# Patient Record
Sex: FEMALE | Race: WHITE | Employment: OTHER | URBAN - NONMETROPOLITAN AREA
[De-identification: names, ages, dates, MRNs, and addresses within clinical notes are randomized per-mention and may not be internally consistent; named-entity substitution may affect disease eponyms.]

---

## 2019-12-26 ENCOUNTER — OFFICE VISIT (OUTPATIENT)
Dept: FAMILY MEDICINE CLINIC | Age: 69
End: 2019-12-26
Payer: MEDICARE

## 2019-12-26 VITALS
HEIGHT: 61 IN | TEMPERATURE: 97.1 F | WEIGHT: 224 LBS | OXYGEN SATURATION: 96 % | BODY MASS INDEX: 42.29 KG/M2 | HEART RATE: 68 BPM | DIASTOLIC BLOOD PRESSURE: 78 MMHG | SYSTOLIC BLOOD PRESSURE: 130 MMHG

## 2019-12-26 DIAGNOSIS — J20.9 ACUTE BRONCHITIS, UNSPECIFIED ORGANISM: Primary | ICD-10-CM

## 2019-12-26 PROCEDURE — G8427 DOCREV CUR MEDS BY ELIG CLIN: HCPCS | Performed by: PHYSICIAN ASSISTANT

## 2019-12-26 PROCEDURE — 1090F PRES/ABSN URINE INCON ASSESS: CPT | Performed by: PHYSICIAN ASSISTANT

## 2019-12-26 PROCEDURE — 1123F ACP DISCUSS/DSCN MKR DOCD: CPT | Performed by: PHYSICIAN ASSISTANT

## 2019-12-26 PROCEDURE — 4040F PNEUMOC VAC/ADMIN/RCVD: CPT | Performed by: PHYSICIAN ASSISTANT

## 2019-12-26 PROCEDURE — G8419 CALC BMI OUT NRM PARAM NOF/U: HCPCS | Performed by: PHYSICIAN ASSISTANT

## 2019-12-26 PROCEDURE — 4004F PT TOBACCO SCREEN RCVD TLK: CPT | Performed by: PHYSICIAN ASSISTANT

## 2019-12-26 PROCEDURE — G8400 PT W/DXA NO RESULTS DOC: HCPCS | Performed by: PHYSICIAN ASSISTANT

## 2019-12-26 PROCEDURE — 99203 OFFICE O/P NEW LOW 30 MIN: CPT | Performed by: PHYSICIAN ASSISTANT

## 2019-12-26 PROCEDURE — G8484 FLU IMMUNIZE NO ADMIN: HCPCS | Performed by: PHYSICIAN ASSISTANT

## 2019-12-26 PROCEDURE — 3017F COLORECTAL CA SCREEN DOC REV: CPT | Performed by: PHYSICIAN ASSISTANT

## 2019-12-26 RX ORDER — FENOFIBRATE 160 MG/1
TABLET ORAL
COMMUNITY
Start: 2019-11-19

## 2019-12-26 RX ORDER — GLIPIZIDE 5 MG/1
TABLET, FILM COATED, EXTENDED RELEASE ORAL
COMMUNITY
Start: 2019-11-19

## 2019-12-26 RX ORDER — CEFDINIR 300 MG/1
300 CAPSULE ORAL 2 TIMES DAILY
Qty: 20 CAPSULE | Refills: 0 | Status: SHIPPED | OUTPATIENT
Start: 2019-12-26 | End: 2020-01-05

## 2019-12-26 RX ORDER — GABAPENTIN 300 MG/1
CAPSULE ORAL
COMMUNITY
Start: 2019-10-10

## 2019-12-26 RX ORDER — METHYLPREDNISOLONE 4 MG/1
TABLET ORAL
Qty: 1 KIT | Refills: 0 | Status: SHIPPED | OUTPATIENT
Start: 2019-12-26 | End: 2022-03-15

## 2019-12-26 RX ORDER — METHOCARBAMOL 750 MG/1
TABLET ORAL
COMMUNITY
Start: 2019-11-07

## 2019-12-26 RX ORDER — OMEPRAZOLE 20 MG/1
CAPSULE, DELAYED RELEASE ORAL
COMMUNITY
Start: 2019-10-22

## 2019-12-26 RX ORDER — METOPROLOL TARTRATE 50 MG/1
TABLET, FILM COATED ORAL
COMMUNITY
Start: 2019-11-19

## 2019-12-26 RX ORDER — PAROXETINE HYDROCHLORIDE 40 MG/1
TABLET, FILM COATED ORAL
COMMUNITY
Start: 2019-11-19

## 2019-12-26 RX ORDER — HYDROXYZINE HYDROCHLORIDE 10 MG/1
TABLET, FILM COATED ORAL
COMMUNITY
Start: 2019-11-19 | End: 2022-03-15

## 2019-12-26 RX ORDER — MELOXICAM 7.5 MG/1
TABLET ORAL
COMMUNITY
Start: 2019-11-19

## 2019-12-26 RX ORDER — HYDROCHLOROTHIAZIDE 25 MG/1
TABLET ORAL
COMMUNITY
Start: 2019-09-18

## 2022-01-31 ENCOUNTER — OFFICE VISIT (OUTPATIENT)
Dept: NEUROSURGERY | Age: 72
End: 2022-01-31
Payer: MEDICARE

## 2022-01-31 VITALS — HEIGHT: 61 IN | OXYGEN SATURATION: 98 % | RESPIRATION RATE: 18 BRPM | WEIGHT: 224 LBS | BODY MASS INDEX: 42.29 KG/M2

## 2022-01-31 DIAGNOSIS — D32.9 MENINGIOMA (HCC): Primary | ICD-10-CM

## 2022-01-31 DIAGNOSIS — R55 SYNCOPE, UNSPECIFIED SYNCOPE TYPE: ICD-10-CM

## 2022-01-31 PROCEDURE — 3017F COLORECTAL CA SCREEN DOC REV: CPT | Performed by: NEUROLOGICAL SURGERY

## 2022-01-31 PROCEDURE — 1123F ACP DISCUSS/DSCN MKR DOCD: CPT | Performed by: NEUROLOGICAL SURGERY

## 2022-01-31 PROCEDURE — 1090F PRES/ABSN URINE INCON ASSESS: CPT | Performed by: NEUROLOGICAL SURGERY

## 2022-01-31 PROCEDURE — G8484 FLU IMMUNIZE NO ADMIN: HCPCS | Performed by: NEUROLOGICAL SURGERY

## 2022-01-31 PROCEDURE — G8400 PT W/DXA NO RESULTS DOC: HCPCS | Performed by: NEUROLOGICAL SURGERY

## 2022-01-31 PROCEDURE — 99203 OFFICE O/P NEW LOW 30 MIN: CPT | Performed by: NEUROLOGICAL SURGERY

## 2022-01-31 PROCEDURE — 4004F PT TOBACCO SCREEN RCVD TLK: CPT | Performed by: NEUROLOGICAL SURGERY

## 2022-01-31 PROCEDURE — 4040F PNEUMOC VAC/ADMIN/RCVD: CPT | Performed by: NEUROLOGICAL SURGERY

## 2022-01-31 PROCEDURE — G8417 CALC BMI ABV UP PARAM F/U: HCPCS | Performed by: NEUROLOGICAL SURGERY

## 2022-01-31 PROCEDURE — G8427 DOCREV CUR MEDS BY ELIG CLIN: HCPCS | Performed by: NEUROLOGICAL SURGERY

## 2022-01-31 RX ORDER — BENZTROPINE MESYLATE 0.5 MG/1
TABLET ORAL
COMMUNITY
Start: 2022-01-06

## 2022-01-31 RX ORDER — ARIPIPRAZOLE 10 MG/1
TABLET ORAL
COMMUNITY
Start: 2021-12-09

## 2022-01-31 RX ORDER — LANCETS 33 GAUGE
EACH MISCELLANEOUS
COMMUNITY
Start: 2022-01-11

## 2022-01-31 RX ORDER — ATORVASTATIN CALCIUM 40 MG/1
TABLET, FILM COATED ORAL
COMMUNITY
Start: 2021-12-17

## 2022-01-31 RX ORDER — CALCIUM CITRATE/VITAMIN D3 200MG-6.25
TABLET ORAL
COMMUNITY
Start: 2022-01-11

## 2022-01-31 NOTE — LETTER
[address] [place] [address] 59. [address] [facility] [address]  Phone: [phone]  Fax: [phone]           Nicole Breaux MD      January 31, 2022     Patient: Mi Patel   MR Number: <[id number]>   YOB: 1950   Date of Visit: 1/31/2022       Dear Dr. Lin Links ref. provider found: Thank you for referring Mi Patel to me for evaluation/treatment. Below are the relevant portions of my assessment and plan of care. If you have questions, please do not hesitate to call me. I look forward to following Loreto ABRAHAM along with you.     Sincerely,        Nicole Breaux MD    CC providers:    No Recipients
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
## 2022-02-21 ENCOUNTER — TELEPHONE (OUTPATIENT)
Dept: ADMINISTRATIVE | Age: 72
End: 2022-02-21

## 2022-02-21 NOTE — TELEPHONE ENCOUNTER
Pt called to check the status of her referral to neurology. Two previous in-basket messages have been sent to review the referral since pt was referred for syncope from Dr. Vincent Aranda. Advised pt to contact pcp to be referred to a cardiologist.   Pt is concerned because she has non-malignant brain tumor. She thought Magdalena Merchant was reviewing the referral to see if she could schedule.   Please advise if able to schedule with Radha Ang, or if she needs to see a cardiologist first.

## 2022-03-11 ENCOUNTER — TELEPHONE (OUTPATIENT)
Dept: NEUROSURGERY | Age: 72
End: 2022-03-11

## 2022-03-11 DIAGNOSIS — F40.240 CLAUSTROPHOBIA: ICD-10-CM

## 2022-03-11 DIAGNOSIS — F41.9 ANXIETY: Primary | ICD-10-CM

## 2022-03-11 RX ORDER — LORAZEPAM 0.5 MG/1
0.5 TABLET ORAL ONCE
Qty: 2 TABLET | Refills: 0 | Status: SHIPPED | OUTPATIENT
Start: 2022-03-11 | End: 2022-03-11

## 2022-03-11 NOTE — TELEPHONE ENCOUNTER
Called pt adam to let her know Ativan was called in to pharmacy and she would need to take it 30 minutes before her mri in May.

## 2022-03-11 NOTE — TELEPHONE ENCOUNTER
Patient is scheduled for mri on 5/7/22. She has claustrophobia. She wants a rx for it. Would like it to be sent to [facility] in [place].

## 2022-03-15 ENCOUNTER — OFFICE VISIT (OUTPATIENT)
Dept: NEUROLOGY | Age: 72
End: 2022-03-15
Payer: MEDICARE

## 2022-03-15 VITALS
DIASTOLIC BLOOD PRESSURE: 86 MMHG | WEIGHT: 222 LBS | TEMPERATURE: 97.6 F | BODY MASS INDEX: 43.59 KG/M2 | HEART RATE: 71 BPM | HEIGHT: 60 IN | SYSTOLIC BLOOD PRESSURE: 151 MMHG | OXYGEN SATURATION: 97 %

## 2022-03-15 DIAGNOSIS — R55 ATYPICAL SYNCOPE: ICD-10-CM

## 2022-03-15 DIAGNOSIS — G45.9 TIA (TRANSIENT ISCHEMIC ATTACK): Primary | ICD-10-CM

## 2022-03-15 DIAGNOSIS — R41.3 SHORT-TERM MEMORY LOSS: ICD-10-CM

## 2022-03-15 DIAGNOSIS — G45.9 TIA (TRANSIENT ISCHEMIC ATTACK): ICD-10-CM

## 2022-03-15 LAB
ALBUMIN SERPL-MCNC: 4.2 G/DL (ref 3.5–5.2)
ALP BLD-CCNC: 59 U/L (ref 35–104)
ALT SERPL-CCNC: 26 U/L (ref 0–32)
ANION GAP SERPL CALCULATED.3IONS-SCNC: 17 MMOL/L (ref 7–16)
AST SERPL-CCNC: 22 U/L (ref 0–31)
BASOPHILS ABSOLUTE: 0.03 E9/L (ref 0–0.2)
BASOPHILS RELATIVE PERCENT: 0.4 % (ref 0–2)
BILIRUB SERPL-MCNC: 0.4 MG/DL (ref 0–1.2)
BUN BLDV-MCNC: 17 MG/DL (ref 6–23)
CALCIUM SERPL-MCNC: 9.6 MG/DL (ref 8.6–10.2)
CHLORIDE BLD-SCNC: 100 MMOL/L (ref 98–107)
CO2: 24 MMOL/L (ref 22–29)
CREAT SERPL-MCNC: 1 MG/DL (ref 0.5–1)
EOSINOPHILS ABSOLUTE: 0.21 E9/L (ref 0.05–0.5)
EOSINOPHILS RELATIVE PERCENT: 3.1 % (ref 0–6)
FOLATE: 12.8 NG/ML (ref 4.8–24.2)
GFR AFRICAN AMERICAN: >60
GFR NON-AFRICAN AMERICAN: 55 ML/MIN/1.73
GLUCOSE BLD-MCNC: 136 MG/DL (ref 74–99)
HCT VFR BLD CALC: 36.7 % (ref 34–48)
HEMOGLOBIN: 12.1 G/DL (ref 11.5–15.5)
IMMATURE GRANULOCYTES #: 0.02 E9/L
IMMATURE GRANULOCYTES %: 0.3 % (ref 0–5)
LYMPHOCYTES ABSOLUTE: 1.45 E9/L (ref 1.5–4)
LYMPHOCYTES RELATIVE PERCENT: 21.2 % (ref 20–42)
MCH RBC QN AUTO: 29 PG (ref 26–35)
MCHC RBC AUTO-ENTMCNC: 33 % (ref 32–34.5)
MCV RBC AUTO: 88 FL (ref 80–99.9)
MONOCYTES ABSOLUTE: 0.59 E9/L (ref 0.1–0.95)
MONOCYTES RELATIVE PERCENT: 8.6 % (ref 2–12)
NEUTROPHILS ABSOLUTE: 4.55 E9/L (ref 1.8–7.3)
NEUTROPHILS RELATIVE PERCENT: 66.4 % (ref 43–80)
PDW BLD-RTO: 14 FL (ref 11.5–15)
PLATELET # BLD: 173 E9/L (ref 130–450)
PMV BLD AUTO: 11.2 FL (ref 7–12)
POTASSIUM SERPL-SCNC: 4.9 MMOL/L (ref 3.5–5)
RBC # BLD: 4.17 E12/L (ref 3.5–5.5)
SODIUM BLD-SCNC: 141 MMOL/L (ref 132–146)
TOTAL PROTEIN: 7.3 G/DL (ref 6.4–8.3)
VITAMIN B-12: 529 PG/ML (ref 211–946)
VITAMIN D 25-HYDROXY: 52 NG/ML (ref 30–100)
WBC # BLD: 6.9 E9/L (ref 4.5–11.5)

## 2022-03-15 PROCEDURE — 3017F COLORECTAL CA SCREEN DOC REV: CPT | Performed by: NURSE PRACTITIONER

## 2022-03-15 PROCEDURE — G8427 DOCREV CUR MEDS BY ELIG CLIN: HCPCS | Performed by: NURSE PRACTITIONER

## 2022-03-15 PROCEDURE — 1123F ACP DISCUSS/DSCN MKR DOCD: CPT | Performed by: NURSE PRACTITIONER

## 2022-03-15 PROCEDURE — 1090F PRES/ABSN URINE INCON ASSESS: CPT | Performed by: NURSE PRACTITIONER

## 2022-03-15 PROCEDURE — G8417 CALC BMI ABV UP PARAM F/U: HCPCS | Performed by: NURSE PRACTITIONER

## 2022-03-15 PROCEDURE — 99204 OFFICE O/P NEW MOD 45 MIN: CPT | Performed by: NURSE PRACTITIONER

## 2022-03-15 PROCEDURE — G8484 FLU IMMUNIZE NO ADMIN: HCPCS | Performed by: NURSE PRACTITIONER

## 2022-03-15 PROCEDURE — G8400 PT W/DXA NO RESULTS DOC: HCPCS | Performed by: NURSE PRACTITIONER

## 2022-03-15 PROCEDURE — 4004F PT TOBACCO SCREEN RCVD TLK: CPT | Performed by: NURSE PRACTITIONER

## 2022-03-15 PROCEDURE — 4040F PNEUMOC VAC/ADMIN/RCVD: CPT | Performed by: NURSE PRACTITIONER
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
## 2022-03-20 LAB — VITAMIN B1 WHOLE BLOOD: 111 NMOL/L (ref 70–180)

## 2022-03-28 ENCOUNTER — HOSPITAL ENCOUNTER (OUTPATIENT)
Dept: SPEECH THERAPY | Age: 72
Setting detail: THERAPIES SERIES
Discharge: HOME | End: 2022-03-28
Payer: MEDICARE

## 2022-03-28 PROCEDURE — 96125 COGNITIVE TEST BY HC PRO: CPT

## 2022-03-28 NOTE — PROGRESS NOTES
[address] [address]  Outpatient Speech Therapy  Phone: [phone] Fax: [phone] [address] [facility] INFORMATION PROCESSING ASSESSMENT-2 (RIPA-2)   EVALUATION RESULTS and PLAN OF CARE    PATIENT NAME:  Magi Garcia  (female)     MRN:  [id number]    :  1950  (80 y.o.)  STATUS:  Outpatient clinic   TODAY'S DATE:  3/28/2022  REFERRING PROVIDER:    KASH Sparks    PROVIDER NPI:  [id number]  [facility]  Date of order:  3-15-22  REFERRAL DIAGNOSIS:  Short term memory loss, R41.3  EVALUATING THERAPIST: GO Trevino    CERTIFICATION/RECERTIFICATION PERIOD: 3/28/2022 to 22  INSURANCE PROVIDER: Medicare    CPT Codes  EVALUATION: 15200  standardized cognitive performance testing (per hour)  TREATMENT:  Patient deferred formal therapy at this time    REFERRING/TREATMENT DIAGNOSIS: Other amnesia [R41.3]          SPEECH THERAPY  PLAN OF CARE   The speech therapy  POC is established based on physician order, speech pathology diagnosis and results of clinical assessment     SPEECH PATHOLOGY DIAGNOSIS:      Mild Cognitive Deficit in the areas of Immediate Memory, Recall of General Information, Organization, and Auditory Processing/Retention    Intervention is not warranted at this time, as patient declined coming in for formal therapy. She would like to use this evaluation for baseline information. She will continue to follow with her neurologist.      Conditions Requiring Skilled Therapeutic Intervention for speech, language and/or cognition    Not applicable     Specific Speech Therapy Interventions to Include:   Not applicable    Specific instructions for next treatment:    not applicable    SHORT/LONG TERM GOALS  Not applicable. Patient declined formal therapy at this time.      Patient goals: Patient/family involved in developing goals and treatment plan:   Treatment goals discussed with Patient    The Patient understand(s) the diagnosis, prognosis and plan of care   The patient/family Agreed with above,     This plan may be re-evaluated and revised as warranted. Rehabilitation Potential/Prognosis: not applicable                            History: Patient expressed mild deficits with both her short-term and long-term memory, but these deficits do not bother her other than her inability to drive. She lives alone and completes her ADLs with some assistance from family with finances. Stimulus questions were obtained from the RIPA-2. There are 30 possible points for each subtest.   Severity ratings for each subtest were determined as follows:     RAW SCORE  SEVERITY    28-30  Within functional limits    25-27  Mild deficit    22-24  Moderate deficit    19-21  Marked deficit    16-18 Severe deficit   Below 16 Profound deficit         Subtest  Raw Score /Severity    Immediate Memory  26/30- Mild deficit   Recent Memory  28/30- WFL   Temporal Orientation   (Recent Memory)  30/30-WFL   Temporal Orientation   (Remote Memory)  29/30-WFL   Spatial Orientation  29/30-WFL   Orientation to Environment  30/30-WFL   Recall of General Information  25/30- Mild defcicit   Problem Solving & Abstract Reasoning  28/30-WFL   Organization  27/30-Mild deficit   Auditory Processing   & Retention  26/30- Mild deficit      VARIANT OBSERVATIONS PRESENT DURING THE EVALATION:   Errors  Partially correct  Tangential  Self corrected                               EDUCATION:   The Speech Language Pathologist (SLP) completed education regarding results of evaluation and that intervention is not warranted at this time. (patient declined formal therapy)  Learner: Patient  Education: Reviewed results and recommendations of this evaluation  Evaluation of Education:  Verbalizes understanding    This plan may be re-evaluated and revised as warranted.       Treatment goals discussed with Patient   The Patient understand(s) the diagnosis, prognosis and plan of care     Evaluation Time includes thorough review of current medical information, gathering information on past medical history/social history and prior level of function, completion of standardized testing/informal observation of tasks, assessment of data and education on plan of care and goals. The admitting diagnosis and active problem list, as listed below have been reviewed prior to initiation of this evaluation. ACTIVE PROBLEM LIST: There is no problem list on file for this patient. [facility] [address] [address] [place] [address]  3/28/2022      [address]        Phone: [phone]     If you have any questions or concerns, please don't hesitate to call. Thank you for your referral.    Physician/Provider Signature:________________________________Date:__________________  By signing above, the therapists plan is approved by the physician/provider. All patients under [employer] must be signed by physician/provider.

## 2022-04-06 ENCOUNTER — HOSPITAL ENCOUNTER (OUTPATIENT)
Dept: NEUROLOGY | Age: 72
Discharge: HOME | End: 2022-04-06
Payer: MEDICARE

## 2022-04-06 DIAGNOSIS — R55 ATYPICAL SYNCOPE: ICD-10-CM

## 2022-04-06 PROCEDURE — 95819 EEG AWAKE AND ASLEEP: CPT

## 2022-04-08 ENCOUNTER — OFFICE VISIT (OUTPATIENT)
Dept: NEUROLOGY | Age: 72
End: 2022-04-08
Payer: MEDICARE

## 2022-04-08 VITALS
TEMPERATURE: 97.9 F | DIASTOLIC BLOOD PRESSURE: 70 MMHG | HEART RATE: 77 BPM | SYSTOLIC BLOOD PRESSURE: 144 MMHG | OXYGEN SATURATION: 96 %

## 2022-04-08 DIAGNOSIS — G45.9 TIA (TRANSIENT ISCHEMIC ATTACK): Primary | ICD-10-CM

## 2022-04-08 DIAGNOSIS — R41.3 SHORT-TERM MEMORY LOSS: ICD-10-CM

## 2022-04-08 DIAGNOSIS — R55 ATYPICAL SYNCOPE: ICD-10-CM

## 2022-04-08 PROCEDURE — G8417 CALC BMI ABV UP PARAM F/U: HCPCS | Performed by: NURSE PRACTITIONER

## 2022-04-08 PROCEDURE — 3017F COLORECTAL CA SCREEN DOC REV: CPT | Performed by: NURSE PRACTITIONER

## 2022-04-08 PROCEDURE — G8400 PT W/DXA NO RESULTS DOC: HCPCS | Performed by: NURSE PRACTITIONER

## 2022-04-08 PROCEDURE — 1123F ACP DISCUSS/DSCN MKR DOCD: CPT | Performed by: NURSE PRACTITIONER

## 2022-04-08 PROCEDURE — 1090F PRES/ABSN URINE INCON ASSESS: CPT | Performed by: NURSE PRACTITIONER

## 2022-04-08 PROCEDURE — 99213 OFFICE O/P EST LOW 20 MIN: CPT | Performed by: NURSE PRACTITIONER

## 2022-04-08 PROCEDURE — G8427 DOCREV CUR MEDS BY ELIG CLIN: HCPCS | Performed by: NURSE PRACTITIONER

## 2022-04-08 PROCEDURE — 4040F PNEUMOC VAC/ADMIN/RCVD: CPT | Performed by: NURSE PRACTITIONER

## 2022-04-08 PROCEDURE — 4004F PT TOBACCO SCREEN RCVD TLK: CPT | Performed by: NURSE PRACTITIONER
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
## 2022-04-12 PROCEDURE — 95816 EEG AWAKE AND DROWSY: CPT | Performed by: PSYCHIATRY & NEUROLOGY

## 2022-04-12 NOTE — PROCEDURES
EEG report    This 71-year-old woman, on aripiprazole, atorvastatin, benztropine, gabapentin, glipizide, hydrochlorothiazide, metoprolol, paroxetine, meloxicam, metformin and cholecalciferol, displayed the following underlying rhythms---fairly well-organized, synchronous, 10 Hz, 10 to 20 µV alpha rhythms in both posterior regions. 18 Hz, 10 µV beta rhythms are noted in both precentral regions. There was symmetrical attenuation of the posterior alpha rhythms with eye opening. Hyperventilation and photic stimulation were not performed. The patient did become drowsy, progressing uneventfully through stage I of somnolence. Throughout this tracing, there were no focal abnormalities or epileptiform discharges.     Impression---normal awake and drowsy EEG

## 2022-04-13 ENCOUNTER — TELEPHONE (OUTPATIENT)
Dept: NEUROLOGY | Age: 72
End: 2022-04-13

## 2022-04-13 NOTE — TELEPHONE ENCOUNTER
MA notified patient of EEG results.   Electronically signed by Jed Cunha MA on 4/13/22 at 12:34 PM EDT

## 2022-04-13 NOTE — TELEPHONE ENCOUNTER
Patient would like results of EEG.   Electronically signed by Jozef Davidson MA on 4/13/22 at 9:12 AM EDT

## 2022-04-15 ENCOUNTER — TELEPHONE (OUTPATIENT)
Dept: NEUROLOGY | Age: 72
End: 2022-04-15

## 2022-04-15 NOTE — TELEPHONE ENCOUNTER
Patient called in for results of her EEG. Informed her of the results. She is asking if she can drive. Please advise.

## 2022-04-18 NOTE — TELEPHONE ENCOUNTER
Patient notified of Oyselyn's response.   Electronically signed by Yolande Bumpers, MA on 4/18/22 at 10:59 AM EDT

## 2022-05-02 DIAGNOSIS — D32.9 MENINGIOMA (HCC): Primary | ICD-10-CM

## 2022-05-07 ENCOUNTER — HOSPITAL ENCOUNTER (OUTPATIENT)
Dept: MRI IMAGING | Age: 72
Discharge: HOME | End: 2022-05-09
Payer: MEDICARE

## 2022-05-07 ENCOUNTER — HOSPITAL ENCOUNTER (OUTPATIENT)
Age: 72
Discharge: HOME | End: 2022-05-07
Payer: MEDICARE

## 2022-05-07 DIAGNOSIS — D32.9 MENINGIOMA (HCC): ICD-10-CM

## 2022-05-07 LAB
BUN BLDV-MCNC: 15 MG/DL (ref 6–23)
CREAT SERPL-MCNC: 0.9 MG/DL (ref 0.5–1)
GFR AFRICAN AMERICAN: >60
GFR NON-AFRICAN AMERICAN: >60 ML/MIN/1.73

## 2022-05-07 PROCEDURE — 84520 ASSAY OF UREA NITROGEN: CPT

## 2022-05-07 PROCEDURE — 36415 COLL VENOUS BLD VENIPUNCTURE: CPT

## 2022-05-07 PROCEDURE — 82565 ASSAY OF CREATININE: CPT

## 2022-05-19 ENCOUNTER — TELEPHONE (OUTPATIENT)
Dept: NEUROSURGERY | Age: 72
End: 2022-05-19

## 2022-05-19 NOTE — TELEPHONE ENCOUNTER
Patient is calling about her MRI with sedation at [facility].  She would like a call back.   [phone] phone

## 2022-05-19 NOTE — TELEPHONE ENCOUNTER
Patient is calling about her MRI with sedation at [facility].    She would  like a call back. [phone] phone  I called her to let her know it was faxed and she will hear back when approved.

## 2022-06-24 ENCOUNTER — OFFICE VISIT (OUTPATIENT)
Dept: NEUROSURGERY | Age: 72
End: 2022-06-24
Payer: MEDICARE

## 2022-06-24 VITALS
DIASTOLIC BLOOD PRESSURE: 54 MMHG | TEMPERATURE: 98.3 F | BODY MASS INDEX: 43.59 KG/M2 | HEIGHT: 60 IN | HEART RATE: 75 BPM | RESPIRATION RATE: 20 BRPM | OXYGEN SATURATION: 94 % | SYSTOLIC BLOOD PRESSURE: 126 MMHG | WEIGHT: 222 LBS

## 2022-06-24 DIAGNOSIS — D32.9 MENINGIOMA (HCC): Primary | ICD-10-CM

## 2022-06-24 PROCEDURE — G8417 CALC BMI ABV UP PARAM F/U: HCPCS | Performed by: NEUROLOGICAL SURGERY

## 2022-06-24 PROCEDURE — 1036F TOBACCO NON-USER: CPT | Performed by: NEUROLOGICAL SURGERY

## 2022-06-24 PROCEDURE — 3017F COLORECTAL CA SCREEN DOC REV: CPT | Performed by: NEUROLOGICAL SURGERY

## 2022-06-24 PROCEDURE — G8427 DOCREV CUR MEDS BY ELIG CLIN: HCPCS | Performed by: NEUROLOGICAL SURGERY

## 2022-06-24 PROCEDURE — 1090F PRES/ABSN URINE INCON ASSESS: CPT | Performed by: NEUROLOGICAL SURGERY

## 2022-06-24 PROCEDURE — 99212 OFFICE O/P EST SF 10 MIN: CPT

## 2022-06-24 PROCEDURE — 1123F ACP DISCUSS/DSCN MKR DOCD: CPT | Performed by: NEUROLOGICAL SURGERY

## 2022-06-24 PROCEDURE — G8400 PT W/DXA NO RESULTS DOC: HCPCS | Performed by: NEUROLOGICAL SURGERY

## 2022-06-24 PROCEDURE — 99214 OFFICE O/P EST MOD 30 MIN: CPT | Performed by: NEUROLOGICAL SURGERY

## 2022-06-24 RX ORDER — LANOLIN ALCOHOL/MO/W.PET/CERES
CREAM (GRAM) TOPICAL
COMMUNITY
Start: 2022-05-09

## 2022-06-24 NOTE — PROGRESS NOTES
of Transportation (Medical): Not on file    Lack of Transportation (Non-Medical):  Not on file   Physical Activity:     Days of Exercise per Week: Not on file    Minutes of Exercise per Session: Not on file   Stress:     Feeling of Stress : Not on file   Social Connections:     Frequency of Communication with Friends and Family: Not on file    Frequency of Social Gatherings with Friends and Family: Not on file    Attends Orthodoxy Services: Not on file    Active Member of Clubs or Organizations: Not on file    Attends Club or Organization Meetings: Not on file    Marital Status: Not on file   Intimate Partner Violence:     Fear of Current or Ex-Partner: Not on file    Emotionally Abused: Not on file    Physically Abused: Not on file    Sexually Abused: Not on file   Housing Stability:     Unable to Pay for Housing in the Last Year: Not on file    Number of Places Lived in the Last Year: Not on file    Unstable Housing in the Last Year: Not on file       Medications:   Current Outpatient Medications   Medication Sig Dispense Refill    magnesium oxide (MAG-OX) 400 (240 Mg) MG tablet TAKE 1 TABLET BY MOUTH TWICE DAILY      ARIPiprazole (ABILIFY) 10 MG tablet TAKE 1 TABLET BY MOUTH EVERY DAY      atorvastatin (LIPITOR) 40 MG tablet       benztropine (COGENTIN) 0.5 MG tablet TAKE 1 TABLET BY MOUTH TWICE DAILY      Blood Glucose Monitoring Suppl (TRUE METRIX METER) w/Device KIT USE AS DIRECTED      TRUE METRIX BLOOD GLUCOSE TEST strip USE AS DIRECTED to test BLOOD SUGAR THREE TIMES DAILY      Drug [place] Unilet Lancets 33G MISC USE AS DIRECTED THREE TIMES DAILY      fenofibrate 160 MG tablet       gabapentin (NEURONTIN) 300 MG capsule       glipiZIDE (GLUCOTROL XL) 5 MG extended release tablet       hydrochlorothiazide (HYDRODIURIL) 25 MG tablet       meloxicam (MOBIC) 7.5 MG tablet       metFORMIN (GLUCOPHAGE) 850 MG tablet       metoprolol tartrate (LOPRESSOR) 50 MG tablet       omeprazole (PRILOSEC) 20 MG delayed release capsule       PARoxetine (PAXIL) 40 MG tablet       D3-50 1.25 MG (94155 UT) CAPS TAKE 1 CAPSULE BY MOUTH ONCE A WEEK       No current facility-administered medications for this visit. Allergies:    Codeine, Darvon [propoxyphene], Iodine, and Vicodin [hydrocodone-acetaminophen]       Review of Systems:    Denies any chest pain, headache, dyspnea, recent weight loss, fevers, chills or night sweats. Physical Examination:    BP (!) 126/54   Pulse 75   Temp 98.3 °F (36.8 °C)   Resp 20   Ht 5' (1.524 m)   Wt 222 lb (100.7 kg)   SpO2 94%   BMI 43.36 kg/m²      On focused neurological examination, she  is awake alert oriented and rationally conversant. Speech is clear and fluent. Pupils are equal and reactive to light bilaterally, extraocular movements are intact, visual fields are full to confrontation. Her  face is symmetric and grossly intact to fine touch. Uvula and tongue are both midline. Shoulder shrug is symmetric and strong. Motor examination reveals preserved power in the upper and lower extremities at 5 out of 5 throughout. Reflexes are symmetric and brisk. Plantar responses are downgoing. There is no clonus. Patient is intact to fine touch in all dermatomes throughout. ASSESSMENT:    I personally reviewed Yuly Lundberg's radiographic images, particularly MRI from [facility] dated 18 May 2022 which demonstrates a stable right orbital meningioma. 1. Meningioma ([address]  -     MRI BRAIN W WO CONTRAST; Future      MEDICAL DECISION MAKING & PLAN:    I showed Ms. Veronique Schmidt her images and explained the findings. Like to see her in 1 years time with a follow-up MRI in hand less new neurosurgical issues arise prior to this. Patient is happy with this plan and all questions were answered. Thank you so much for allowing us to participate in the care of this patient.     Return in about 1 year (around 6/24/2023) for needs tests completed prior to appt, discuss results. Electronically signed by Tamera Cevallos MD on 6/27/2022 at 3:10 PM       NOTE: This report was transcribed using voice recognition software.  Every effort was made to ensure accuracy; however, inadvertent computerized transcription errors may be present

## 2022-11-29 ENCOUNTER — OFFICE VISIT (OUTPATIENT)
Dept: FAMILY MEDICINE CLINIC | Age: 72
End: 2022-11-29
Payer: MEDICARE

## 2022-11-29 VITALS
WEIGHT: 228 LBS | DIASTOLIC BLOOD PRESSURE: 72 MMHG | TEMPERATURE: 97.2 F | SYSTOLIC BLOOD PRESSURE: 120 MMHG | BODY MASS INDEX: 44.76 KG/M2 | RESPIRATION RATE: 16 BRPM | OXYGEN SATURATION: 95 % | HEIGHT: 60 IN | HEART RATE: 80 BPM

## 2022-11-29 DIAGNOSIS — S30.0XXA CONTUSION OF LOWER BACK, INITIAL ENCOUNTER: Primary | ICD-10-CM

## 2022-11-29 DIAGNOSIS — S40.011A CONTUSION OF RIGHT SHOULDER, INITIAL ENCOUNTER: ICD-10-CM

## 2022-11-29 PROCEDURE — 1090F PRES/ABSN URINE INCON ASSESS: CPT | Performed by: FAMILY MEDICINE

## 2022-11-29 PROCEDURE — 1123F ACP DISCUSS/DSCN MKR DOCD: CPT | Performed by: FAMILY MEDICINE

## 2022-11-29 PROCEDURE — G8400 PT W/DXA NO RESULTS DOC: HCPCS | Performed by: FAMILY MEDICINE

## 2022-11-29 PROCEDURE — 99214 OFFICE O/P EST MOD 30 MIN: CPT | Performed by: FAMILY MEDICINE

## 2022-11-29 PROCEDURE — 1036F TOBACCO NON-USER: CPT | Performed by: FAMILY MEDICINE

## 2022-11-29 PROCEDURE — G8417 CALC BMI ABV UP PARAM F/U: HCPCS | Performed by: FAMILY MEDICINE

## 2022-11-29 PROCEDURE — 3017F COLORECTAL CA SCREEN DOC REV: CPT | Performed by: FAMILY MEDICINE

## 2022-11-29 PROCEDURE — G8484 FLU IMMUNIZE NO ADMIN: HCPCS | Performed by: FAMILY MEDICINE

## 2022-11-29 PROCEDURE — G8427 DOCREV CUR MEDS BY ELIG CLIN: HCPCS | Performed by: FAMILY MEDICINE

## 2022-11-29 RX ORDER — INSULIN GLARGINE 100 [IU]/ML
INJECTION, SOLUTION SUBCUTANEOUS
COMMUNITY
Start: 2022-11-05

## 2022-11-29 RX ORDER — TIZANIDINE 4 MG/1
4 TABLET ORAL NIGHTLY PRN
Qty: 10 TABLET | Refills: 0 | Status: SHIPPED | OUTPATIENT
Start: 2022-11-29

## 2022-11-29 ASSESSMENT — ENCOUNTER SYMPTOMS
NAUSEA: 0
VOMITING: 0
CHEST TIGHTNESS: 0
ABDOMINAL PAIN: 0
PHOTOPHOBIA: 0
EYE PAIN: 0
SORE THROAT: 0
SINUS PAIN: 0
SHORTNESS OF BREATH: 0
EYE DISCHARGE: 0
BACK PAIN: 1
DIARRHEA: 0
BLOOD IN STOOL: 0
EYE REDNESS: 0
TROUBLE SWALLOWING: 0
COUGH: 0
ALLERGIC/IMMUNOLOGIC NEGATIVE: 1

## 2022-11-29 NOTE — PROGRESS NOTES
22  Arlette Galindo : 1950 Sex: female  Age: 71 y.o. Assessment and Plan:  Christopher Ramírez was seen today for fall and back pain. Diagnoses and all orders for this visit:    Contusion of lower back, initial encounter  -     XR LUMBAR SPINE (2-3 VIEWS); Future  -     XR SACRUM COCCYX (MIN 2 VIEWS); Future  -     tiZANidine (ZANAFLEX) 4 MG tablet; Take 1 tablet by mouth nightly as needed (spasm)    Contusion of right shoulder, initial encounter  -     XR CLAVICLE RIGHT; Future  -     XR SHOULDER RIGHT (MIN 2 VIEWS); Future  -     tiZANidine (ZANAFLEX) 4 MG tablet; Take 1 tablet by mouth nightly as needed (spasm)    X-rays of the right shoulder and lower back are good for fracture by my reading. Final disposition per radiology report. We will go ahead and have her use Tylenol ice or heat to the area and 1 Zanaflex at bedtime for the next week to 10 days. She should follow-up with her PCP in 3 to 5 days for recheck. Her complaints not improve or worsen in any way, she should be checked earlier. Return 3 to 5-day recheck with PCP. Francisco Javier Heredia Chief Complaint   Patient presents with    Fall     2 falls in last 4 days     Back Pain       This patient presents with a history of 2 falls over giving weekend. In both cases, she was going down steps and falling down 3 or 4 at a time. Her lower back and left shoulder were involved both times. He denied head injury, loss of consciousness, weakness, paresthesias, seizure disorder. Denies chest pain, palpitations, shortness of breath, pheresis. Review of Systems   Constitutional:  Negative for appetite change, fatigue and unexpected weight change. HENT:  Negative for congestion, ear pain, hearing loss, sinus pain, sore throat and trouble swallowing. Eyes:  Negative for photophobia, pain, discharge and redness. Respiratory:  Negative for cough, chest tightness and shortness of breath.     Cardiovascular:  Negative for chest pain, palpitations and leg swelling. Gastrointestinal:  Negative for abdominal pain, blood in stool, diarrhea, nausea and vomiting. Endocrine: Negative. Genitourinary:  Negative for dysuria, flank pain, frequency and hematuria. Musculoskeletal:  Positive for arthralgias, back pain, gait problem and myalgias. Negative for joint swelling. Lower back, right shoulder   Skin: Negative. Allergic/Immunologic: Negative. Neurological:  Negative for dizziness, seizures, syncope, weakness, light-headedness, numbness and headaches. Hematological:  Negative for adenopathy. Does not bruise/bleed easily. Psychiatric/Behavioral: Negative.          Current Outpatient Medications:     tiZANidine (ZANAFLEX) 4 MG tablet, Take 1 tablet by mouth nightly as needed (spasm), Disp: 10 tablet, Rfl: 0    magnesium oxide (MAG-OX) 400 (240 Mg) MG tablet, TAKE 1 TABLET BY MOUTH TWICE DAILY, Disp: , Rfl:     ARIPiprazole (ABILIFY) 10 MG tablet, TAKE 1 TABLET BY MOUTH EVERY DAY, Disp: , Rfl:     atorvastatin (LIPITOR) 40 MG tablet, , Disp: , Rfl:     benztropine (COGENTIN) 0.5 MG tablet, TAKE 1 TABLET BY MOUTH TWICE DAILY, Disp: , Rfl:     Blood Glucose Monitoring Suppl (TRUE METRIX METER) w/Device KIT, USE AS DIRECTED, Disp: , Rfl:     TRUE METRIX BLOOD GLUCOSE TEST strip, USE AS DIRECTED to test BLOOD SUGAR THREE TIMES DAILY, Disp: , Rfl:     Drug [place] Unilet Lancets 33G MISC, USE AS DIRECTED THREE TIMES DAILY, Disp: , Rfl:     fenofibrate 160 MG tablet, , Disp: , Rfl:     gabapentin (NEURONTIN) 300 MG capsule, , Disp: , Rfl:     glipiZIDE (GLUCOTROL XL) 5 MG extended release tablet, , Disp: , Rfl:     hydrochlorothiazide (HYDRODIURIL) 25 MG tablet, , Disp: , Rfl:     meloxicam (MOBIC) 7.5 MG tablet, , Disp: , Rfl:     metFORMIN (GLUCOPHAGE) 850 MG tablet, , Disp: , Rfl:     metoprolol tartrate (LOPRESSOR) 50 MG tablet, , Disp: , Rfl:     omeprazole (PRILOSEC) 20 MG delayed release capsule, , Disp: , Rfl:     PARoxetine (PAXIL) 40 MG tablet, , Disp: , Rfl:     D3-50 1.25 MG (56270 UT) CAPS, TAKE 1 CAPSULE BY MOUTH ONCE A WEEK, Disp: , Rfl:     LANTUS SOLOSTAR 100 UNIT/ML injection pen, , Disp: , Rfl:   Allergies   Allergen Reactions    Codeine     Darvon [Propoxyphene]     Iodine     Vicodin [Hydrocodone-Acetaminophen]        No past medical history on file. No past surgical history on file. No family history on file. Social History     Socioeconomic History    Marital status: Unknown     Spouse name: Not on file    Number of children: Not on file    Years of education: Not on file    Highest education level: Not on file   Occupational History    Not on file   Tobacco Use    Smoking status: Never    Smokeless tobacco: Never   Vaping Use    Vaping Use: Never used   Substance and Sexual Activity    Alcohol use: Not Currently    Drug use: Not Currently    Sexual activity: Not on file   Other Topics Concern    Not on file   Social History Narrative    Not on file     Social Determinants of Health     Financial Resource Strain: Not on file   Food Insecurity: Not on file   Transportation Needs: Not on file   Physical Activity: Not on file   Stress: Not on file   Social Connections: Not on file   Intimate Partner Violence: Not on file   Housing Stability: Not on file       Vitals:    11/29/22 1042   BP: 120/72   Pulse: 80   Resp: 16   Temp: 97.2 °F (36.2 °C)   TempSrc: Temporal   SpO2: 95%   Weight: 228 lb (103.4 kg)   Height: 5' (1.524 m)       Physical Exam  Vitals and nursing note reviewed. Constitutional:       Appearance: She is well-developed. HENT:      Head: Atraumatic. Right Ear: External ear normal.      Left Ear: External ear normal.      Nose: Nose normal.      Mouth/Throat:      Pharynx: No oropharyngeal exudate. Eyes:      Conjunctiva/sclera: Conjunctivae normal.      Pupils: Pupils are equal, round, and reactive to light. Neck:      Thyroid: No thyromegaly. Trachea: No tracheal deviation.    Cardiovascular:      Rate and Rhythm: Normal rate and regular rhythm. Heart sounds: No murmur heard. No friction rub. No gallop. Pulmonary:      Effort: Pulmonary effort is normal. No respiratory distress. Breath sounds: Normal breath sounds. Abdominal:      General: Bowel sounds are normal.      Palpations: Abdomen is soft. Musculoskeletal:         General: No tenderness or deformity. Normal range of motion. Cervical back: Normal range of motion and neck supple. Comments: Pain, stiffness, decreased range of motion right shoulder, lumbar spine. No radicular pain down either leg. Good color, pulses, sensation, range of motion of distal extremities. Lymphadenopathy:      Cervical: No cervical adenopathy. Skin:     General: Skin is warm and dry. Capillary Refill: Capillary refill takes less than 2 seconds. Findings: No rash. Neurological:      Mental Status: She is alert and oriented to person, place, and time. Sensory: No sensory deficit. Motor: No abnormal muscle tone.       Coordination: Coordination normal.      Deep Tendon Reflexes: Reflexes normal.           Seen By:  Yang Almeida DO

## 2023-06-19 ENCOUNTER — TELEPHONE (OUTPATIENT)
Dept: NEUROSURGERY | Age: 73
End: 2023-06-19

## 2023-06-19 DIAGNOSIS — D32.9 MENINGIOMA (HCC): Primary | ICD-10-CM

## 2023-06-19 NOTE — TELEPHONE ENCOUNTER
New MRI ordered, can fax to [facility]. I would recommend [facility] if patient is looking for a more specialized neuropsychiatrist. External referral placed.

## 2023-06-19 NOTE — TELEPHONE ENCOUNTER
Patient's daughter Brenda Gee called, she stated that patient has appointment on 27th for 1 year follow up w/ Brain MRI. She stated that they have an order for the MRI Brain w/wo contrast but they asked if they could have MRI Brain w/wo contrast w/ anesthesia - she states that a pill or light sedation does not work that it has to be with IV anesthesia. .. They would like the order to be put in and they want to go to [facility] to complete the test.     She also asked if we have any recommendations of neuropsychiatrists or psychiatrists in the area because hers is going to no longer be practicing. I told her the only one I know of is [facility] but I would ask.      Daughters phone: [phone] Francena Bernheim)

## 2023-06-29 ENCOUNTER — OFFICE VISIT (OUTPATIENT)
Dept: NEUROSURGERY | Age: 73
End: 2023-06-29
Payer: MEDICARE

## 2023-06-29 DIAGNOSIS — D32.9 MENINGIOMA (HCC): Primary | ICD-10-CM

## 2023-06-29 PROCEDURE — G8400 PT W/DXA NO RESULTS DOC: HCPCS | Performed by: NEUROLOGICAL SURGERY

## 2023-06-29 PROCEDURE — G8427 DOCREV CUR MEDS BY ELIG CLIN: HCPCS | Performed by: NEUROLOGICAL SURGERY

## 2023-06-29 PROCEDURE — 3017F COLORECTAL CA SCREEN DOC REV: CPT | Performed by: NEUROLOGICAL SURGERY

## 2023-06-29 PROCEDURE — G8417 CALC BMI ABV UP PARAM F/U: HCPCS | Performed by: NEUROLOGICAL SURGERY

## 2023-06-29 PROCEDURE — 99214 OFFICE O/P EST MOD 30 MIN: CPT | Performed by: NEUROLOGICAL SURGERY

## 2023-06-29 PROCEDURE — 1090F PRES/ABSN URINE INCON ASSESS: CPT | Performed by: NEUROLOGICAL SURGERY

## 2023-06-29 PROCEDURE — 1036F TOBACCO NON-USER: CPT | Performed by: NEUROLOGICAL SURGERY

## 2023-06-29 PROCEDURE — 1123F ACP DISCUSS/DSCN MKR DOCD: CPT | Performed by: NEUROLOGICAL SURGERY

## 2023-07-07 ENCOUNTER — HOSPITAL ENCOUNTER (EMERGENCY)
Age: 73
Discharge: HOME | End: 2023-07-07
Payer: MEDICARE

## 2023-07-07 VITALS
TEMPERATURE: 98.2 F | BODY MASS INDEX: 41.91 KG/M2 | OXYGEN SATURATION: 98 % | HEART RATE: 79 BPM | WEIGHT: 222 LBS | DIASTOLIC BLOOD PRESSURE: 69 MMHG | RESPIRATION RATE: 16 BRPM | HEIGHT: 61 IN | SYSTOLIC BLOOD PRESSURE: 155 MMHG

## 2023-07-07 DIAGNOSIS — K13.79 OTHER LESIONS OF ORAL MUCOSA: ICD-10-CM

## 2023-07-07 DIAGNOSIS — F41.1 ANXIETY STATE: ICD-10-CM

## 2023-07-07 DIAGNOSIS — G25.9 EXTRAPYRAMIDAL AND MOVEMENT DISORDER, UNSPECIFIED: ICD-10-CM

## 2023-07-07 DIAGNOSIS — K13.79 ORAL CAVITY PAIN: ICD-10-CM

## 2023-07-07 DIAGNOSIS — N30.01 ACUTE CYSTITIS WITH HEMATURIA: Primary | ICD-10-CM

## 2023-07-07 DIAGNOSIS — E87.1 ACUTE HYPONATREMIA: ICD-10-CM

## 2023-07-07 LAB
ALBUMIN SERPL-MCNC: 3.9 G/DL (ref 3.5–5.2)
ALP SERPL-CCNC: 68 U/L (ref 35–104)
ALT SERPL-CCNC: 29 U/L (ref 0–32)
ANION GAP SERPL CALCULATED.3IONS-SCNC: 14 MMOL/L (ref 7–16)
AST SERPL-CCNC: 23 U/L (ref 0–31)
BACTERIA URNS QL MICRO: ABNORMAL /HPF
BASOPHILS # BLD: 0.01 E9/L (ref 0–0.2)
BASOPHILS NFR BLD: 0.2 % (ref 0–2)
BILIRUB SERPL-MCNC: 0.4 MG/DL (ref 0–1.2)
BILIRUB UR QL STRIP: NEGATIVE
BUN SERPL-MCNC: 13 MG/DL (ref 6–23)
CALCIUM SERPL-MCNC: 9.3 MG/DL (ref 8.6–10.2)
CHLORIDE SERPL-SCNC: 94 MMOL/L (ref 98–107)
CLARITY UR: CLEAR
CO2 SERPL-SCNC: 22 MMOL/L (ref 22–29)
COLOR UR: YELLOW
CREAT SERPL-MCNC: 0.9 MG/DL (ref 0.5–1)
EOSINOPHIL # BLD: 0.12 E9/L (ref 0.05–0.5)
EOSINOPHIL NFR BLD: 1.9 % (ref 0–6)
EPI CELLS #/AREA URNS HPF: ABNORMAL /HPF
ERYTHROCYTE [DISTWIDTH] IN BLOOD BY AUTOMATED COUNT: 13.9 FL (ref 11.5–15)
GLUCOSE SERPL-MCNC: 119 MG/DL (ref 74–99)
GLUCOSE UR STRIP-MCNC: NEGATIVE MG/DL
HCT VFR BLD AUTO: 34.2 % (ref 34–48)
HGB BLD-MCNC: 11.8 G/DL (ref 11.5–15.5)
HGB UR QL STRIP: ABNORMAL
IMM GRANULOCYTES # BLD: 0.02 E9/L
IMM GRANULOCYTES NFR BLD: 0.3 % (ref 0–5)
KETONES UR STRIP-MCNC: NEGATIVE MG/DL
LEUKOCYTE ESTERASE UR QL STRIP: ABNORMAL
LYMPHOCYTES # BLD: 1.42 E9/L (ref 1.5–4)
LYMPHOCYTES NFR BLD: 22.2 % (ref 20–42)
MCH RBC QN AUTO: 28.6 PG (ref 26–35)
MCHC RBC AUTO-ENTMCNC: 34.5 % (ref 32–34.5)
MCV RBC AUTO: 82.8 FL (ref 80–99.9)
MONOCYTES # BLD: 0.72 E9/L (ref 0.1–0.95)
MONOCYTES NFR BLD: 11.3 % (ref 2–12)
NEUTROPHILS # BLD: 4.11 E9/L (ref 1.8–7.3)
NEUTS SEG NFR BLD: 64.1 % (ref 43–80)
NITRITE UR QL STRIP: NEGATIVE
PH UR STRIP: 5.5 [PH] (ref 5–9)
PLATELET # BLD AUTO: 194 E9/L (ref 130–450)
PMV BLD AUTO: 10.6 FL (ref 7–12)
POTASSIUM SERPL-SCNC: 3.6 MMOL/L (ref 3.5–5)
PROT SERPL-MCNC: 7 G/DL (ref 6.4–8.3)
PROT UR STRIP-MCNC: NEGATIVE MG/DL
RBC # BLD AUTO: 4.13 E12/L (ref 3.5–5.5)
RBC #/AREA URNS HPF: ABNORMAL /HPF (ref 0–2)
SODIUM SERPL-SCNC: 130 MMOL/L (ref 132–146)
SP GR UR STRIP: 1.02 (ref 1–1.03)
UROBILINOGEN UR STRIP-ACNC: 0.2 E.U./DL
WBC # BLD: 6.4 E9/L (ref 4.5–11.5)
WBC #/AREA URNS HPF: ABNORMAL /HPF (ref 0–5)

## 2023-07-07 PROCEDURE — 96372 THER/PROPH/DIAG INJ SC/IM: CPT

## 2023-07-07 PROCEDURE — 87186 SC STD MICRODIL/AGAR DIL: CPT

## 2023-07-07 PROCEDURE — 99284 EMERGENCY DEPT VISIT MOD MDM: CPT

## 2023-07-07 PROCEDURE — 81001 URINALYSIS AUTO W/SCOPE: CPT

## 2023-07-07 PROCEDURE — 87077 CULTURE AEROBIC IDENTIFY: CPT

## 2023-07-07 PROCEDURE — 85025 COMPLETE CBC W/AUTO DIFF WBC: CPT

## 2023-07-07 PROCEDURE — 36415 COLL VENOUS BLD VENIPUNCTURE: CPT

## 2023-07-07 PROCEDURE — 80053 COMPREHEN METABOLIC PANEL: CPT

## 2023-07-07 PROCEDURE — 6360000002 HC RX W HCPCS

## 2023-07-07 PROCEDURE — 6370000000 HC RX 637 (ALT 250 FOR IP)

## 2023-07-07 PROCEDURE — 87088 URINE BACTERIA CULTURE: CPT

## 2023-07-07 RX ORDER — CEFDINIR 300 MG/1
300 CAPSULE ORAL 2 TIMES DAILY
Qty: 14 CAPSULE | Refills: 0 | Status: SHIPPED | OUTPATIENT
Start: 2023-07-07 | End: 2023-07-14

## 2023-07-07 RX ORDER — HYDROXYZINE PAMOATE 25 MG/1
25 CAPSULE ORAL 3 TIMES DAILY PRN
Qty: 30 CAPSULE | Refills: 0 | Status: SHIPPED | OUTPATIENT
Start: 2023-07-07 | End: 2023-07-17

## 2023-07-07 RX ORDER — CEFDINIR 300 MG/1
300 CAPSULE ORAL ONCE
Status: COMPLETED | OUTPATIENT
Start: 2023-07-07 | End: 2023-07-07

## 2023-07-07 RX ORDER — SODIUM CHLORIDE 0.9 % (FLUSH) 0.9 %
SYRINGE (ML) INJECTION
Status: DISCONTINUED
Start: 2023-07-07 | End: 2023-07-08 | Stop reason: HOSPADM

## 2023-07-07 RX ORDER — VALACYCLOVIR HYDROCHLORIDE 1 G/1
1000 TABLET, FILM COATED ORAL DAILY
Qty: 5 TABLET | Refills: 0 | Status: SHIPPED | OUTPATIENT
Start: 2023-07-07 | End: 2023-07-12

## 2023-07-07 RX ORDER — HYDROXYZINE HYDROCHLORIDE 50 MG/ML
25 INJECTION, SOLUTION INTRAMUSCULAR ONCE
Status: COMPLETED | OUTPATIENT
Start: 2023-07-07 | End: 2023-07-07

## 2023-07-07 RX ADMIN — CEFDINIR 300 MG: 300 CAPSULE ORAL at 22:15

## 2023-07-07 RX ADMIN — HYDROXYZINE HYDROCHLORIDE 25 MG: 50 INJECTION, SOLUTION INTRAMUSCULAR at 20:20

## 2023-07-07 ASSESSMENT — PAIN SCALES - GENERAL: PAINLEVEL_OUTOF10: 10

## 2023-07-07 ASSESSMENT — PAIN DESCRIPTION - LOCATION: LOCATION: MOUTH

## 2023-07-07 ASSESSMENT — PAIN - FUNCTIONAL ASSESSMENT: PAIN_FUNCTIONAL_ASSESSMENT: 0-10

## 2023-07-08 NOTE — ED NOTES
Discharge instructions given, medications and follow up instructions reviewed. Patient verbalized understanding, no other noted or stated problems at this time. Patient will follow up with physicians as directed.         Debbie Trejo RN  07/07/23 0604

## 2023-07-09 LAB
BACTERIA UR CULT: ABNORMAL
ORGANISM: ABNORMAL

## 2023-07-10 LAB
BACTERIA UR CULT: ABNORMAL
ORGANISM: ABNORMAL

## 2023-07-12 DIAGNOSIS — D32.9 MENINGIOMA (HCC): ICD-10-CM

## 2023-07-17 ENCOUNTER — HOSPITAL ENCOUNTER (OUTPATIENT)
Dept: PSYCHIATRY | Age: 73
Setting detail: THERAPIES SERIES
Discharge: HOME | End: 2023-07-17

## 2023-07-17 DIAGNOSIS — F41.9 ANXIETY: Primary | ICD-10-CM

## 2023-07-17 RX ORDER — PAROXETINE HYDROCHLORIDE 40 MG/1
40 TABLET, FILM COATED ORAL EVERY MORNING
Qty: 30 TABLET | Refills: 0 | Status: SHIPPED | OUTPATIENT
Start: 2023-07-17

## 2023-07-17 RX ORDER — CLONAZEPAM 0.5 MG/1
0.5 TABLET ORAL NIGHTLY
Qty: 30 TABLET | Refills: 0 | Status: SHIPPED | OUTPATIENT
Start: 2023-07-17 | End: 2023-08-16

## 2023-07-17 NOTE — H&P
Medications include HCTZ, insulin, Losartan, meloxicam, metformin, aspirin and Lipitor. ALLERGIES: Codeine, Darvon [propoxyphene], Iodine, and Vicodin [hydrocodone-acetaminophen]    FAMILY PSYCHIATRIC HISTORY: Noncontributory. SOCIAL HISTORY: Patient lives alone and is independent with ADL's except finances. She was  in 2019. Patient has two daughters and one son - [facility]) lives down the street and other Buddie Favorite) is in [place]. SUBSTANCE ABUSE HISTORY: Denies. MENTAL STATUS EXAM: White female appears age. Pleasant, cooperative, forthcoming. Normal psychomotor activity aside from oral dyskinetic movements. Normal gait, strength, tone, eye contact. Mood depressed. Affect mildly restricted. Speech clear. Thought process organized without loosening. Content future-oriented. No active SI or HI. No paranoia, delusions or hallucinations. Cognition formally tested with MoCA which patient scored 22 of 30 on. She missed two on executive function, one on orientation and five on delayed recall. Fund of knowledge fair. Language use fair. Insight and judgment fair. MEDICATIONS:  Paxil 40 mg daily (cont)     Klonopin 0.5 mg in evening (new)     Stop Cogentin and Vistaril      ASSESSMENT:  MDD recurrent moderate     Anxiety Disorder unspecified     Mild Neurocognitive Impairment     History of Learning Disorder     Rule-out Tardive Dyskinesia    PLAN: Discussed diagnostic impressions and treatment recommendations with patient and family in detail. I believe antidepressant augmentation is reasonable but prior to that it was mutually agreed to try getting these involuntary oral movements better controlled as this seems to be a big stress to [place]. The timing of onset of the dyskinesia suggests it is secondary to withdrawal of antipsychotic; however, the duration is more consistent with tardive dyskinesia. Also family is of the opinion that patient's anxiety level could possibly be the culprit.

## 2023-07-24 ENCOUNTER — HOSPITAL ENCOUNTER (OUTPATIENT)
Dept: PSYCHIATRY | Age: 73
Setting detail: THERAPIES SERIES
Discharge: HOME | End: 2023-07-24
Payer: MEDICARE

## 2023-07-24 PROCEDURE — 99214 OFFICE O/P EST MOD 30 MIN: CPT | Performed by: PSYCHIATRY & NEUROLOGY

## 2023-07-24 RX ORDER — VALBENAZINE 40 MG/1
40 CAPSULE ORAL DAILY
Qty: 30 CAPSULE | Refills: 0 | Status: SHIPPED | OUTPATIENT
Start: 2023-07-24

## 2023-07-24 NOTE — PROGRESS NOTES
PSYCHIATRY ATTENDING NOTE    CC: \"I am sleeping better. \"     S: Patient being seen at outpatient clinic in follow-up for MDD, anxiety, MCI and possible TD. Low-dose Klonopin added last appointment Met with patient to discuss progress with treatment. Daughter present for session. 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 Pleasant, cooperative, forthcoming. Normal psychomotor activity aside from oral dyskinetic movements. Normal gait, strength, tone, eye contact. Mood depressed. Affect mildly restricted. Speech clear. Thought process organized without loosening. Content future-oriented. No active SI or HI. No paranoia, delusions or hallucinations. Cognition formally tested with MoCA which patient scored 22 of 30 on. She missed two on executive function, one on orientation and five on delayed recall. Fund of knowledge fair. Language use fair. Insight and judgment fair. MEDICATIONS:   Ingrezza 40 mg daily (new)  Paxil 40 mg daily (cont)  Klonopin 0.5 mg in evening (new)    ASSESSMENT:  MDD recurrent moderate                                      Anxiety Disorder unspecified                                      Mild Neurocognitive Impairment                                      History of Learning Disorder                                      Tardive Dyskinesia     PLAN: Continue current medications. Start Iris Mater. Continue to monitor and adjust treatment. See back two weeks. Neuro-psych testing pending.                            Electronically signed by

## 2023-08-07 ENCOUNTER — HOSPITAL ENCOUNTER (OUTPATIENT)
Dept: PSYCHIATRY | Age: 73
Setting detail: THERAPIES SERIES
Discharge: HOME | End: 2023-08-07
Payer: MEDICARE

## 2023-08-07 DIAGNOSIS — F41.9 ANXIETY: ICD-10-CM

## 2023-08-07 PROCEDURE — 99214 OFFICE O/P EST MOD 30 MIN: CPT | Performed by: PSYCHIATRY & NEUROLOGY

## 2023-08-07 RX ORDER — CLONAZEPAM 0.5 MG/1
0.5 TABLET ORAL NIGHTLY
Qty: 30 TABLET | Refills: 0 | Status: SHIPPED | OUTPATIENT
Start: 2023-08-07 | End: 2023-09-06

## 2023-08-31 ENCOUNTER — HOSPITAL ENCOUNTER (OUTPATIENT)
Dept: PSYCHIATRY | Age: 73
Setting detail: THERAPIES SERIES
Discharge: HOME | End: 2023-08-31
Payer: MEDICARE

## 2023-08-31 DIAGNOSIS — F41.9 ANXIETY: ICD-10-CM

## 2023-08-31 PROCEDURE — 99214 OFFICE O/P EST MOD 30 MIN: CPT | Performed by: PSYCHIATRY & NEUROLOGY

## 2023-08-31 RX ORDER — CLONAZEPAM 0.5 MG/1
0.5 TABLET ORAL NIGHTLY
Qty: 30 TABLET | Refills: 0 | Status: SHIPPED | OUTPATIENT
Start: 2023-08-31 | End: 2023-09-30

## 2023-08-31 RX ORDER — BUPROPION HYDROCHLORIDE 150 MG/1
150 TABLET ORAL EVERY MORNING
Qty: 30 TABLET | Refills: 0 | Status: SHIPPED | OUTPATIENT
Start: 2023-08-31

## 2023-08-31 RX ORDER — VALBENAZINE 40 MG/1
40 CAPSULE ORAL DAILY
Qty: 30 CAPSULE | Refills: 0 | Status: SHIPPED | OUTPATIENT
Start: 2023-08-31

## 2023-08-31 RX ORDER — PAROXETINE HYDROCHLORIDE 40 MG/1
40 TABLET, FILM COATED ORAL EVERY MORNING
Qty: 30 TABLET | Refills: 0 | Status: SHIPPED | OUTPATIENT
Start: 2023-08-31

## 2023-08-31 NOTE — PROGRESS NOTES
PSYCHIATRY ATTENDING NOTE    CC: \"A lot of anxiety. \"    S: Patient being seen at outpatient clinic in follow-up for MDD, anxiety, MCI and TD. Dysphoric today  Reports increased anxiety and depression  Describes anxiety as \"feeling shaky inside\"  Acknowledges depressive symptoms of dysphoria, anhedonia, tearfulness and poor concentration  Patient feels she does need antidepressant augmentation  Discussed treatment options in detail - agreeable to Wellbutrin  Still having sedation with Marion Gavino but prefers to stay on as it is working for TD  Scheduled for neuro-psych testing at [facility] in October    MSE: Baldemar Breed female appears age. Pleasant, cooperative, forthcoming. Normal psychomotor activity. Oral dyskinesis significantly improved. Normal gait, strength, tone, eye contact. Mood dysphoric. Affect congruent. Speech clear. Thought process organized without loosening. Content future-oriented. No active SI or HI. No paranoia, delusions or hallucinations. Orientation, concentration, recent and remote memory are grossly intact. Fund of knowledge fair. Language use fair. Insight and judgment fair. MEDICATIONS:   Wellbutrin  mg daily (new)  Ingrezza 40 mg daily (cont)  Paxil 40 mg daily (cont)  Klonopin 0.5 mg in evening (cont)    ASSESSMENT:  MDD recurrent moderate                                      Anxiety Disorder unspecified                                      Mild Neurocognitive Impairment                                      History of Learning Disorder                                      Tardive Dyskinesia     PLAN: Continue current medications but start Wellbutrin for antidepressant augmentation to help with mood, energy, motivation and concentration. Risks/benefits/alternatives discussed. No seizure history. Continue to monitor symptoms, side effects and response to medications. See back two weeks. If tolerating the 150 mg will likely increase to 300 mg.                            Electronically signed

## 2023-09-14 ENCOUNTER — HOSPITAL ENCOUNTER (OUTPATIENT)
Dept: PSYCHIATRY | Age: 73
Setting detail: THERAPIES SERIES
Discharge: HOME | End: 2023-09-14
Payer: MEDICARE

## 2023-09-14 DIAGNOSIS — F41.9 ANXIETY: ICD-10-CM

## 2023-09-14 PROCEDURE — 99214 OFFICE O/P EST MOD 30 MIN: CPT | Performed by: PSYCHIATRY & NEUROLOGY

## 2023-09-14 RX ORDER — BUPROPION HYDROCHLORIDE 150 MG/1
150 TABLET ORAL EVERY MORNING
Qty: 30 TABLET | Refills: 0 | Status: SHIPPED | OUTPATIENT
Start: 2023-09-14

## 2023-09-14 RX ORDER — CLONAZEPAM 0.5 MG/1
0.5 TABLET ORAL NIGHTLY
Qty: 30 TABLET | Refills: 0 | Status: SHIPPED | OUTPATIENT
Start: 2023-09-14 | End: 2023-10-14

## 2023-09-14 RX ORDER — PAROXETINE HYDROCHLORIDE 40 MG/1
40 TABLET, FILM COATED ORAL EVERY MORNING
Qty: 30 TABLET | Refills: 0 | Status: SHIPPED | OUTPATIENT
Start: 2023-09-14

## 2023-09-14 NOTE — PROGRESS NOTES
PSYCHIATRY ATTENDING NOTE    CC: \"A little more of the mouth movements again. \"    S: Patient being seen at outpatient clinic in follow-up for MDD, anxiety, MCI and TD. Wellbutrin started last appointment. Daughter present for session today. Brighter today  Reports mood improving with Wellbutrin  Complains of mild increase in oral TD movements  Taking Ingrezza 40 mg - declines increase due to sedation  Complains of daytime sedation but also not sleeping well  Discussed adding melatonin 5 mg  Discussed possible replacement of Klonopin with Remeron at some point  Sees neurology next week and has neuro-psych testing at [facility] in October     MSE: White female appears age. Pleasant, cooperative, forthcoming. Normal psychomotor activity. Oral dyskinesis significantly improved. Normal gait, strength, tone, eye contact. Mood improving. Affect congruent. Speech clear. Thought process organized without loosening. Content future-oriented. No active SI or HI. No paranoia, delusions or hallucinations. Orientation, concentration, recent and remote memory are grossly intact. Fund of knowledge fair. Language use fair. Insight and judgment fair. MEDICATIONS:   Wellbutrin  mg daily (cont)  Ingrezza 40 mg daily (cont)  Paxil 40 mg daily (cont)  Klonopin 0.5 mg in evening (cont)  Melatonin 5 mg at bed (new)    ASSESSMENT:  MDD recurrent moderate                                      Anxiety Disorder unspecified                                      Mild Neurocognitive Impairment                                      History of Learning Disorder                                      Tardive Dyskinesia     PLAN: Continue current medications for now but will add melatonin. Continue to monitor symptoms, side effects and response to medications. Adjust treatment as indicated. See back four weeks.                           Electronically signed by Orpah Goldberg, MD on 9/14/2023 at 1:40 PM

## 2023-09-18 ENCOUNTER — OFFICE VISIT (OUTPATIENT)
Dept: NEUROLOGY | Age: 73
End: 2023-09-18
Payer: MEDICARE

## 2023-09-18 VITALS
TEMPERATURE: 98.5 F | OXYGEN SATURATION: 96 % | SYSTOLIC BLOOD PRESSURE: 133 MMHG | BODY MASS INDEX: 40.7 KG/M2 | HEART RATE: 66 BPM | DIASTOLIC BLOOD PRESSURE: 72 MMHG | WEIGHT: 215.4 LBS

## 2023-09-18 DIAGNOSIS — R25.1 TREMOR OF RIGHT HAND: Primary | ICD-10-CM

## 2023-09-18 DIAGNOSIS — G20 PARKINSON'S DISEASE (HCC): ICD-10-CM

## 2023-09-18 DIAGNOSIS — F33.0 MAJOR DEPRESSIVE DISORDER, RECURRENT, MILD (HCC): ICD-10-CM

## 2023-09-18 DIAGNOSIS — F33.1 MAJOR DEPRESSIVE DISORDER, RECURRENT, MODERATE (HCC): ICD-10-CM

## 2023-09-18 DIAGNOSIS — E66.01 OBESITY, CLASS III, BMI 40-49.9 (MORBID OBESITY) (HCC): ICD-10-CM

## 2023-09-18 PROBLEM — F33.9 MAJOR DEPRESSIVE DISORDER, RECURRENT, UNSPECIFIED (HCC): Status: ACTIVE | Noted: 2023-09-18

## 2023-09-18 PROBLEM — G20.A1 PARKINSON'S DISEASE: Status: ACTIVE | Noted: 2023-09-18

## 2023-09-18 PROCEDURE — G8428 CUR MEDS NOT DOCUMENT: HCPCS | Performed by: NURSE PRACTITIONER

## 2023-09-18 PROCEDURE — 1036F TOBACCO NON-USER: CPT | Performed by: NURSE PRACTITIONER

## 2023-09-18 PROCEDURE — 1123F ACP DISCUSS/DSCN MKR DOCD: CPT | Performed by: NURSE PRACTITIONER

## 2023-09-18 PROCEDURE — G8417 CALC BMI ABV UP PARAM F/U: HCPCS | Performed by: NURSE PRACTITIONER

## 2023-09-18 PROCEDURE — 1090F PRES/ABSN URINE INCON ASSESS: CPT | Performed by: NURSE PRACTITIONER

## 2023-09-18 PROCEDURE — G8400 PT W/DXA NO RESULTS DOC: HCPCS | Performed by: NURSE PRACTITIONER

## 2023-09-18 PROCEDURE — 3017F COLORECTAL CA SCREEN DOC REV: CPT | Performed by: NURSE PRACTITIONER

## 2023-09-18 PROCEDURE — 99214 OFFICE O/P EST MOD 30 MIN: CPT | Performed by: NURSE PRACTITIONER
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
## 2023-10-09 ENCOUNTER — HOSPITAL ENCOUNTER (OUTPATIENT)
Dept: PSYCHIATRY | Age: 73
Setting detail: THERAPIES SERIES
Discharge: HOME | End: 2023-10-09
Payer: MEDICARE

## 2023-10-09 DIAGNOSIS — F41.9 ANXIETY: ICD-10-CM

## 2023-10-09 PROCEDURE — 99214 OFFICE O/P EST MOD 30 MIN: CPT | Performed by: PSYCHIATRY & NEUROLOGY

## 2023-10-09 RX ORDER — PAROXETINE HYDROCHLORIDE 40 MG/1
40 TABLET, FILM COATED ORAL EVERY MORNING
Qty: 30 TABLET | Refills: 0 | Status: SHIPPED | OUTPATIENT
Start: 2023-10-09

## 2023-10-09 RX ORDER — VALBENAZINE 40 MG/1
40 CAPSULE ORAL DAILY
Qty: 30 CAPSULE | Refills: 0 | Status: SHIPPED | OUTPATIENT
Start: 2023-10-09

## 2023-10-09 RX ORDER — BUPROPION HYDROCHLORIDE 150 MG/1
150 TABLET ORAL EVERY MORNING
Qty: 30 TABLET | Refills: 0 | Status: SHIPPED | OUTPATIENT
Start: 2023-10-09

## 2023-10-09 RX ORDER — CLONAZEPAM 0.5 MG/1
0.5 TABLET ORAL 2 TIMES DAILY
Qty: 60 TABLET | Refills: 0 | Status: SHIPPED | OUTPATIENT
Start: 2023-10-09 | End: 2023-11-08

## 2023-10-09 NOTE — PROGRESS NOTES
PSYCHIATRY ATTENDING NOTE    CC: \"The movements are worse. \"    S: Patient being seen at outpatient clinic in follow-up for MDD, anxiety, MCI and TD. No medication changes made last appointment. Daughter present for session today. Stan Rice presents a little down; reports oral movements worse and also having resting hand tremor  Saw neurology - trial of Sinemet for possible Parkinson's was started but daughter reports symptoms did not improve with this and patient is no longer taking  Daughter feels a lot of the tremor activity is anxiety-related; feels patient upset with family dynamics as some of her other siblings are more distant from patient  Daughter also reports that anesthesia told her that when patient was given Versed prior to surgery all the movements stopped  Neuro-psych testing scheduled for 10/20 - discussed deferment on cognitive enhancer until after that    MSE: White female appears age. Pleasant, cooperative, forthcoming. Normal psychomotor activity. Oral dyskinesis present. Resting right hand tremor noted. Normal gait, strength, tone, eye contact. Mood mildly dysphoric. Affect congruent. Speech clear. Thought process organized without loosening. Content future-oriented. No active SI or HI. No paranoia, delusions or hallucinations. Orientation, concentration, recent and remote memory are grossly intact. Fund of knowledge fair. Language use fair. Insight and judgment fair. MEDICATIONS:   Wellbutrin  mg daily (cont)  Ingrezza 40 mg daily (cont)  Paxil 40 mg daily (cont)  Klonopin 0.5 mg bid (increasing)    ASSESSMENT:  MDD recurrent moderate  Anxiety Disorder unspecified  Mild Neurocognitive Impairment  History of Learning Disorder  Tardive Dyskinesia     PLAN: Still feel patient has a degree of TD; however, based on family reports it seems that the tremors are more of a manifestation of anxiety.  Furthermore it would be unusual for movements from TD to worsen after discontinuation of the

## 2023-10-30 ENCOUNTER — HOSPITAL ENCOUNTER (OUTPATIENT)
Dept: PSYCHIATRY | Age: 73
Setting detail: THERAPIES SERIES
Discharge: HOME | End: 2023-10-30
Payer: MEDICARE

## 2023-10-30 DIAGNOSIS — F41.9 ANXIETY: ICD-10-CM

## 2023-10-30 PROCEDURE — 99214 OFFICE O/P EST MOD 30 MIN: CPT | Performed by: PSYCHIATRY & NEUROLOGY

## 2023-10-30 RX ORDER — VALBENAZINE 80 MG/1
80 CAPSULE ORAL DAILY
Qty: 30 CAPSULE | Refills: 0 | Status: SHIPPED | OUTPATIENT
Start: 2023-10-30

## 2023-10-30 RX ORDER — BUPROPION HYDROCHLORIDE 150 MG/1
150 TABLET ORAL EVERY MORNING
Qty: 30 TABLET | Refills: 0 | Status: SHIPPED | OUTPATIENT
Start: 2023-10-30

## 2023-10-30 RX ORDER — PAROXETINE HYDROCHLORIDE 40 MG/1
40 TABLET, FILM COATED ORAL EVERY MORNING
Qty: 30 TABLET | Refills: 0 | Status: SHIPPED | OUTPATIENT
Start: 2023-10-30

## 2023-10-30 RX ORDER — CLONAZEPAM 0.5 MG/1
0.5 TABLET ORAL NIGHTLY
Qty: 30 TABLET | Refills: 0 | Status: SHIPPED | OUTPATIENT
Start: 2023-10-30 | End: 2023-11-29

## 2023-10-30 NOTE — PROGRESS NOTES
PSYCHIATRY ATTENDING NOTE    CC: \"I am still having the shaking. \"    S: Patient being seen at outpatient clinic in follow-up for MDD, anxiety, MCI and TD. Klonopin increased last appointment. Daughter present for session today. Presents in fair spirits but still having noticeable right hand tremor and perioral movements  No improvement in symptoms with the morning Klonopin even at 0.5 mg - discussed tapering back to 0.25 mg in the morning for a week and then stopping this but continuing with the 0.5 mg at bedtime  Had neuropsych testing done 10/20 and results pending  Mood a little down still in the context of uncontrolled tremors  Daughter and patient seem to think tremors worse after Wellbutrin - discussed stopping this vs continuing at 150 mg for now while increasing the Marion Gavino which is what was ultimately decided    MSE: White female appears age. Pleasant, cooperative, forthcoming. Normal psychomotor activity. Oral dyskinesis present. Resting right hand tremor noted. Normal gait, strength, tone, eye contact. Mood mildly dysphoric. Affect congruent. Speech clear. Thought process organized without loosening. Content future-oriented. No active SI or HI. No paranoia, delusions or hallucinations. Orientation, concentration, recent and remote memory are grossly intact. Fund of knowledge fair. Language use fair. Insight and judgment fair. MEDICATIONS:   Wellbutrin  mg daily (cont)  Ingrezza 80 mg daily (increasing)  Paxil 40 mg daily (cont)  Klonopin 0.5 mg at bed (lowering)    ASSESSMENT:  MDD recurrent moderate  Anxiety Disorder unspecified  Mild Neurocognitive Impairment  History of Learning Disorder  Tardive Dyskinesia     PLAN: Unclear etiology of tremors - given previous report from neurology it is unlikely this is Parkinson's. A medication side effect from Wellbutrin is possible although it would be highly unusual for the tremor to be unilaterally isolated to one hand.  Given the increase in

## 2023-11-13 ENCOUNTER — TELEPHONE (OUTPATIENT)
Dept: NEUROSURGERY | Age: 73
End: 2023-11-13

## 2023-11-13 ENCOUNTER — HOSPITAL ENCOUNTER (OUTPATIENT)
Dept: PSYCHIATRY | Age: 73
Setting detail: THERAPIES SERIES
Discharge: HOME | End: 2023-11-13
Payer: MEDICARE

## 2023-11-13 DIAGNOSIS — F41.9 ANXIETY: ICD-10-CM

## 2023-11-13 PROCEDURE — 99214 OFFICE O/P EST MOD 30 MIN: CPT | Performed by: PSYCHIATRY & NEUROLOGY

## 2023-11-13 RX ORDER — VALBENAZINE 40 MG/1
40 CAPSULE ORAL DAILY
Qty: 30 CAPSULE | Refills: 0 | Status: SHIPPED | OUTPATIENT
Start: 2023-11-13

## 2023-11-13 RX ORDER — PAROXETINE HYDROCHLORIDE 40 MG/1
40 TABLET, FILM COATED ORAL EVERY MORNING
Qty: 30 TABLET | Refills: 0 | Status: SHIPPED | OUTPATIENT
Start: 2023-11-13

## 2023-11-13 RX ORDER — CLONAZEPAM 0.5 MG/1
0.5 TABLET ORAL NIGHTLY
Qty: 30 TABLET | Refills: 0 | Status: SHIPPED | OUTPATIENT
Start: 2023-11-13 | End: 2023-12-13

## 2023-11-13 NOTE — PROGRESS NOTES
PSYCHIATRY ATTENDING NOTE    CC: \"I wish this would get better. \"    S: Patient being seen at outpatient clinic in follow-up for MDD, anxiety, MCI and TD. Edwina Rose increased to 80 mg last appointment. Daughter present for session today. In fair spirits  Still has resting tremor in right hand/arm - does seem to intensify during session  Reports no improvement with higher dose of Ingrezza and we discussed lowering back to 40 mg  Discussed eliminating the Wellbutrin as this seems to maybe be exacerbating things  Discussed continuing on just Paxil for depression but considering restarting Abilify  Daughter would like patient to have second neurological opinion at [facility]  Neuro-psych testing results reviewed and consistent with previous diagnoses     MSE: White female appears age. Pleasant, cooperative, forthcoming. Normal psychomotor activity. Oral dyskinesis present. Resting right hand tremor noted. Normal gait, strength, tone, eye contact. Mood mildly dysphoric. Affect congruent. Speech clear. Thought process organized without loosening. Content future-oriented. No active SI or HI. No paranoia, delusions or hallucinations. Orientation, concentration, recent and remote memory are grossly intact. Fund of knowledge fair. Language use fair. Insight and judgment fair. MEDICATIONS:   Wellbutrin  mg daily (stop)   Ingrezza 40 mg daily (lowering)  Paxil 40 mg daily (cont)  Klonopin 0.5 mg at bed (cont)    ASSESSMENT:  MDD recurrent moderate  Anxiety Disorder unspecified  Mild Neurocognitive Impairment  History of Learning Disorder  Tardive Dyskinesia     PLAN: Suspect tremors are multifactorial at this point from combination of TD, anxiety and side effects from Wellbutrin - will eliminate this and see if tremors improve. In meantime will go back to 40 mg on the Ingrezza as the 80 mg has been no more beneficial and is causing more sedation.  Daughter would like patient to have a neurological consultation at

## 2023-11-13 NOTE — TELEPHONE ENCOUNTER
Spoke with patients daughter Anu Feldman advised her that Neuropsych testing was received for patient to review; Anu Feldman advised she will call back at a later time to reschedule patient.

## 2023-11-22 RX ORDER — VALBENAZINE 40 MG/1
40 CAPSULE ORAL DAILY
Qty: 90 CAPSULE | Refills: 0 | Status: SHIPPED | OUTPATIENT
Start: 2023-11-22 | End: 2024-02-20

## 2023-12-04 ENCOUNTER — TELEPHONE (OUTPATIENT)
Dept: NEUROSURGERY | Age: 73
End: 2023-12-04

## 2023-12-04 ENCOUNTER — HOSPITAL ENCOUNTER (OUTPATIENT)
Dept: PSYCHIATRY | Age: 73
Setting detail: THERAPIES SERIES
Discharge: HOME | End: 2023-12-04
Payer: MEDICARE

## 2023-12-04 DIAGNOSIS — F41.9 ANXIETY: ICD-10-CM

## 2023-12-04 PROCEDURE — 99214 OFFICE O/P EST MOD 30 MIN: CPT | Performed by: PSYCHIATRY & NEUROLOGY

## 2023-12-04 RX ORDER — PAROXETINE HYDROCHLORIDE 40 MG/1
40 TABLET, FILM COATED ORAL EVERY MORNING
Qty: 30 TABLET | Refills: 0 | Status: SHIPPED | OUTPATIENT
Start: 2023-12-04

## 2023-12-04 RX ORDER — ARIPIPRAZOLE 2 MG/1
2 TABLET ORAL DAILY
Qty: 30 TABLET | Refills: 0 | Status: SHIPPED | OUTPATIENT
Start: 2023-12-04

## 2023-12-04 RX ORDER — CLONAZEPAM 0.5 MG/1
0.5 TABLET ORAL NIGHTLY
Qty: 30 TABLET | Refills: 0 | Status: SHIPPED | OUTPATIENT
Start: 2023-12-04 | End: 2024-01-03

## 2023-12-04 NOTE — TELEPHONE ENCOUNTER
Patient will need to scheduled for her MRI Brain W-WO Contrast under sedation at [facility] she is currently scheduled with Dr. Naina Marks on [phone]

## 2023-12-04 NOTE — PROGRESS NOTES
Lewy body disease is certainly increasing at this point. Daughter would like patient to have another neurological opinion and we were able to make a referral [facility]. In meantime will go ahead and stop the Ingrezza and restart Abilify for mood and in hopes this may paradoxically help the tremors, as they seem to have started once patient stopped this.                             Electronically signed by Janice Santos MD on 12/4/2023 at 2:01 PM

## 2024-01-16 ENCOUNTER — TELEPHONE (OUTPATIENT)
Dept: NEUROLOGY | Age: 74
End: 2024-01-16

## 2024-01-22 ENCOUNTER — HOSPITAL ENCOUNTER (OUTPATIENT)
Dept: PSYCHIATRY | Age: 74
Setting detail: THERAPIES SERIES
Discharge: HOME | End: 2024-01-22

## 2024-01-22 DIAGNOSIS — F41.9 ANXIETY: ICD-10-CM

## 2024-01-22 RX ORDER — CLONAZEPAM 0.5 MG/1
0.5 TABLET ORAL NIGHTLY
Qty: 30 TABLET | Refills: 2 | Status: SHIPPED | OUTPATIENT
Start: 2024-01-22 | End: 2024-04-21

## 2024-01-22 RX ORDER — PAROXETINE HYDROCHLORIDE 40 MG/1
40 TABLET, FILM COATED ORAL EVERY MORNING
Qty: 30 TABLET | Refills: 2 | Status: SHIPPED | OUTPATIENT
Start: 2024-01-22

## 2024-01-22 NOTE — PROGRESS NOTES
PSYCHIATRY ATTENDING NOTE    CC: \"I'm OK.\"    S: Patient being seen at outpatient clinic in follow-up for MDD, anxiety, MCI. No changes made last appointment. Met with patient today to discuss progress. Daughter present via speaker-phone    Patient presents with new-onset repetitive grimacing which daughters also acknowledged  Daughter feels this started after the Abilify was stopped when patient in [place]   She has been compliant with the Sinemet and this seems to have helped the hand tremors  Denies hallucinations or other side effects from Sinemet  Emotionally doing pretty well overall; denies recent depression or anhedonia  Eating and sleeping fine  Still has neuro movement disorder consult scheduled at  next month  Reviewed psychotropics - mutually agreed to keep same    MSE: White female appears age. Pleasant, cooperative, forthcoming. Normal psychomotor activity. Oral dyskinesis present with new grimacing movements. Resting right hand tremor not observable today. Normal gait, strength, tone, eye contact. Mood euthymic. Affect congruent. Speech clear. Thought process organized without loosening. Content future-oriented. No active SI or HI. No paranoia, delusions or hallucinations. Orientation, concentration, recent and remote memory are grossly intact. Fund of knowledge fair. Language use fair. Insight and judgment fair.     MEDICATIONS:   Paxil 40 mg daily (cont)  Klonopin 0.5 mg at bed (cont)    ASSESSMENT:  MDD recurrent moderate  Anxiety Disorder unspecified  Mild Neurocognitive Impairment  History of Learning Disorder  Parkinson's - rule-out LBD    PLAN: Patient seems stable from psychiatric standpoint - no indication for any medication adjustments. I am not certain how to explain these odd oral movements - unsure if this is Parkinson's related. Agree with patient seeing specialist at movement disorder center at [facility]. Otherwise see back three months - call sooner if needed.    Electronically

## 2024-05-16 DIAGNOSIS — F41.9 ANXIETY: ICD-10-CM

## 2024-05-17 RX ORDER — CLONAZEPAM 0.5 MG/1
TABLET ORAL
Qty: 30 TABLET | Refills: 0 | OUTPATIENT
Start: 2024-05-17

## 2024-05-17 NOTE — TELEPHONE ENCOUNTER
Last Appointment:  Visit date not found  Future Appointments   Date Time Provider Department Center   6/13/2025  3:30 PM Shira Barrera MD [facility]

## 2024-05-23 DIAGNOSIS — F41.9 ANXIETY: ICD-10-CM

## 2024-05-24 DIAGNOSIS — F41.9 ANXIETY: ICD-10-CM

## 2024-05-24 RX ORDER — CLONAZEPAM 0.5 MG/1
0.5 TABLET ORAL NIGHTLY
Qty: 30 TABLET | Refills: 0 | Status: SHIPPED | OUTPATIENT
Start: 2024-05-24 | End: 2024-06-23

## 2024-05-24 RX ORDER — CLONAZEPAM 0.5 MG/1
TABLET ORAL
Qty: 30 TABLET | Refills: 0 | OUTPATIENT
Start: 2024-05-24

## 2024-05-24 NOTE — TELEPHONE ENCOUNTER
Last Appointment:  Visit date not found  Future Appointments   Date Time Provider Department Center   6/13/2025  3:30 PM Shira Barrera MD [facility]

## 2024-06-20 DIAGNOSIS — F41.9 ANXIETY: ICD-10-CM

## 2024-06-21 RX ORDER — CLONAZEPAM 0.5 MG/1
TABLET ORAL
Qty: 30 TABLET | Refills: 0 | Status: SHIPPED | OUTPATIENT
Start: 2024-06-21 | End: 2024-07-21

## 2024-06-21 NOTE — TELEPHONE ENCOUNTER
Last Appointment:  Visit date not found  Future Appointments   Date Time Provider Department Center   [facility]  [facility]   6/13/2025  3:30 PM Shira Barrera MD [facility]

## 2024-06-24 ENCOUNTER — HOSPITAL ENCOUNTER (OUTPATIENT)
Dept: PSYCHIATRY | Age: 74
Setting detail: THERAPIES SERIES
Discharge: HOME | End: 2024-06-24
Payer: MEDICARE

## 2024-06-24 DIAGNOSIS — F41.9 ANXIETY: ICD-10-CM

## 2024-06-24 PROCEDURE — 99214 OFFICE O/P EST MOD 30 MIN: CPT | Performed by: PSYCHIATRY & NEUROLOGY

## 2024-06-24 RX ORDER — CLONAZEPAM 0.5 MG/1
0.5 TABLET ORAL NIGHTLY
Qty: 90 TABLET | Refills: 1 | Status: SHIPPED | OUTPATIENT
Start: 2024-06-24 | End: 2024-12-21

## 2024-06-24 RX ORDER — PAROXETINE HYDROCHLORIDE 40 MG/1
40 TABLET, FILM COATED ORAL EVERY MORNING
Qty: 90 TABLET | Refills: 1 | Status: SHIPPED | OUTPATIENT
Start: 2024-06-24

## 2024-06-24 NOTE — PROGRESS NOTES
PSYCHIATRY ATTENDING NOTE    CC: \"I'm OK.\"    S: Patient being seen at outpatient clinic in follow-up for MDD, anxiety, MCI. No changes made last appointment. Met with patient today to discuss progress. Daughter present.    Bonny presents in pretty good spirits today  Ended up seeing neurology at  - diagnosed with combination of mild Parkinson's and TD  Started on amantadine about 3 weeks ago - was told 4-6 weeks for effect  Situational depression as brother-in-law committed suicide - occasional sad days   Reviewed psychotropics - mutually agreed to keep same  To start speech therapy soon     MSE: White female appears age. Pleasant, cooperative, forthcoming. Normal psychomotor activity. Oral dyskinesis. No observable tremor today. Normal gait, strength, tone, eye contact. Mood euthymic. Affect congruent. Speech clear. Thought process organized without loosening. Content future-oriented. No active SI or HI. No paranoia, delusions or hallucinations. Orientation, concentration, recent and remote memory are grossly intact. Fund of knowledge fair. Language use fair. Insight and judgment fair.     MEDICATIONS:   Paxil 40 mg daily (cont)  Klonopin 0.5 mg at bed (cont)  Amantadine 100 mg bid (per neuro)    ASSESSMENT:  MDD recurrent moderate  Anxiety Disorder unspecified  Mild Neurocognitive Impairment  Tardive Dyskinesia   Parkinson's     PLAN: Patient seems stable from psychiatric standpoint - no indication for any medication adjustments. See back about 4 months - call if needed.    Electronically signed by Austin Morley MD on 6/24/2024 at 1:55 PM

## 2024-10-31 ENCOUNTER — HOSPITAL ENCOUNTER (OUTPATIENT)
Dept: PSYCHIATRY | Age: 74
Setting detail: THERAPIES SERIES
Discharge: HOME | End: 2024-10-31
Payer: MEDICARE

## 2024-10-31 PROCEDURE — 99213 OFFICE O/P EST LOW 20 MIN: CPT | Performed by: PSYCHIATRY & NEUROLOGY

## 2024-10-31 RX ORDER — PAROXETINE 40 MG/1
40 TABLET, FILM COATED ORAL EVERY MORNING
Qty: 90 TABLET | Refills: 1 | Status: SHIPPED | OUTPATIENT
Start: 2024-10-31

## 2024-10-31 NOTE — PROGRESS NOTES
PSYCHIATRY ATTENDING NOTE    CC: \"I'm doing pretty good.\"    S: Patient being seen at outpatient clinic in follow-up for MDD, anxiety, MCI. No changes made last appointment. Met with patient today to discuss progress. Daughter present.    Bonny presents in fair spirits  Reports she is doing well overall  Completed speech therapy  Misses  more this time of year; normalized  Neurologist discontinued amantadine and increased Klonopin to 0.5 mg bid  Patient feels depression well-controlled with Paxil; no side effects  Daughter feels she may benefit from individual counseling; patient ambivalent  No acute issues or concerns otherwise     MSE: White female appears age. Pleasant, cooperative, forthcoming. Normal psychomotor activity. Oral dyskinesis present but better. No observable tremor today. Normal gait, strength, tone, eye contact. Mood euthymic. Affect congruent. Speech clear. Thought process organized without loosening. Content future-oriented. No active SI or HI. No paranoia, delusions or hallucinations. Orientation, concentration, recent and remote memory are grossly intact. Fund of knowledge fair. Language use fair. Insight and judgment fair.     MEDICATIONS:   Paxil 40 mg daily (cont)  Klonopin 0.5 mg bid (per neuro)    ASSESSMENT:  MDD recurrent moderate  Anxiety Disorder unspecified  Mild Neurocognitive Impairment  Tardive Dyskinesia   Parkinson's     PLAN: Patient stable at baseline. Continue current treatment. See back 6 months - call sooner if needed.     Electronically signed by Austin Morley MD on 10/31/2024 at 12:46 PM

## 2024-11-25 ENCOUNTER — OFFICE VISIT (OUTPATIENT)
Dept: PODIATRY | Age: 74
End: 2024-11-25
Payer: MEDICARE

## 2024-11-25 VITALS
TEMPERATURE: 97.6 F | SYSTOLIC BLOOD PRESSURE: 136 MMHG | OXYGEN SATURATION: 96 % | HEART RATE: 73 BPM | DIASTOLIC BLOOD PRESSURE: 72 MMHG | BODY MASS INDEX: 39.87 KG/M2 | WEIGHT: 211 LBS

## 2024-11-25 DIAGNOSIS — B35.1 TINEA UNGUIUM: ICD-10-CM

## 2024-11-25 DIAGNOSIS — M79.674 PAIN IN RIGHT TOE(S): ICD-10-CM

## 2024-11-25 DIAGNOSIS — E10.49 OTHER DIABETIC NEUROLOGICAL COMPLICATION ASSOCIATED WITH TYPE 1 DIABETES MELLITUS (HCC): Primary | ICD-10-CM

## 2024-11-25 DIAGNOSIS — I73.9 PERIPHERAL VASCULAR DISEASE, UNSPECIFIED (HCC): ICD-10-CM

## 2024-11-25 DIAGNOSIS — M79.675 PAIN IN LEFT TOE(S): ICD-10-CM

## 2024-11-25 DIAGNOSIS — R26.2 DIFFICULTY WALKING: ICD-10-CM

## 2024-11-25 PROCEDURE — 1123F ACP DISCUSS/DSCN MKR DOCD: CPT | Performed by: PODIATRIST

## 2024-11-25 PROCEDURE — 11721 DEBRIDE NAIL 6 OR MORE: CPT | Performed by: PODIATRIST

## 2024-11-25 PROCEDURE — G8400 PT W/DXA NO RESULTS DOC: HCPCS | Performed by: PODIATRIST

## 2024-11-25 PROCEDURE — 99202 OFFICE O/P NEW SF 15 MIN: CPT | Performed by: PODIATRIST

## 2024-11-25 PROCEDURE — 1036F TOBACCO NON-USER: CPT | Performed by: PODIATRIST

## 2024-11-25 PROCEDURE — 3017F COLORECTAL CA SCREEN DOC REV: CPT | Performed by: PODIATRIST

## 2024-11-25 PROCEDURE — 2022F DILAT RTA XM EVC RTNOPTHY: CPT | Performed by: PODIATRIST

## 2024-11-25 PROCEDURE — G8484 FLU IMMUNIZE NO ADMIN: HCPCS | Performed by: PODIATRIST

## 2024-11-25 PROCEDURE — G8417 CALC BMI ABV UP PARAM F/U: HCPCS | Performed by: PODIATRIST

## 2024-11-25 PROCEDURE — 1090F PRES/ABSN URINE INCON ASSESS: CPT | Performed by: PODIATRIST

## 2024-11-25 PROCEDURE — G8427 DOCREV CUR MEDS BY ELIG CLIN: HCPCS | Performed by: PODIATRIST

## 2024-11-25 PROCEDURE — 1159F MED LIST DOCD IN RCRD: CPT | Performed by: PODIATRIST

## 2024-11-25 PROCEDURE — 3046F HEMOGLOBIN A1C LEVEL >9.0%: CPT | Performed by: PODIATRIST

## 2024-11-25 NOTE — PROGRESS NOTES
[facility] [place] [facility]  [address] [address] [address]  Dept: [phone]  Dept Fax: [phone]  Loc: [phone]    DIABETIC NAIL PROGRESS NOTE  Date of patient's visit: 11/25/2024  Patient's Name:  Bonny Lundberg YOB: 1950            Patient Care Team:  Milagro Gusman DO as PCP - General          Chief Complaint   Patient presents with    Gout     Pt was referred by er for gout which has resolved  Wants nail care today  Milagro Gusman DO  10/10/24    Referral - General    Foot Pain    New Patient    Diabetes       Subjective:   Bonny Lundbegr comes to clinic for Gout (Pt was referred by er for gout which has resolved/Wants nail care today/Milagro Gusman DO/10/10/24), Referral - General, Foot Pain, New Patient, and Diabetes    she is a diabetic and states that did a diabetic foot exam.  Painful nails.  Patient did have a gout was seen in [place] ER but this is resolved.  Pt currently has complaint of thickened, elongated nails that they cannot manage by themselves.   Pt's primary care physician is Milagro Gusman DO    .   No results found for: \"LABA1C\"   Complains of numbness in the feet bilat.  No past medical history on file.  No results found for: \"LABA1C\"    Allergies   Allergen Reactions    Codeine     Darvon [Propoxyphene]     Iodine     Plum Pulp      plums    Vicodin [Hydrocodone-Acetaminophen]      Current Outpatient Medications on File Prior to Visit   Medication Sig Dispense Refill    PARoxetine (PAXIL) 40 MG tablet Take 1 tablet by mouth every morning 90 tablet 1    clonazePAM (KLONOPIN) 0.5 MG tablet Take 1 tablet by mouth at bedtime for 180 days. Max Daily Amount: 0.5 mg 90 tablet 1    LANTUS SOLOSTAR 100 UNIT/ML injection pen       magnesium oxide (MAG-OX) 400 (240 Mg) MG tablet TAKE 1 TABLET BY MOUTH TWICE DAILY      atorvastatin (LIPITOR) 40 MG tablet       Blood Glucose Monitoring Suppl (TRUE METRIX METER)

## 2025-02-03 ENCOUNTER — PROCEDURE VISIT (OUTPATIENT)
Dept: PODIATRY | Age: 75
End: 2025-02-03
Payer: MEDICARE

## 2025-02-03 VITALS
TEMPERATURE: 98.5 F | HEART RATE: 89 BPM | OXYGEN SATURATION: 96 % | WEIGHT: 211 LBS | SYSTOLIC BLOOD PRESSURE: 135 MMHG | DIASTOLIC BLOOD PRESSURE: 79 MMHG | BODY MASS INDEX: 39.87 KG/M2

## 2025-02-03 DIAGNOSIS — B35.1 TINEA UNGUIUM: ICD-10-CM

## 2025-02-03 DIAGNOSIS — I73.9 PERIPHERAL VASCULAR DISEASE, UNSPECIFIED (HCC): ICD-10-CM

## 2025-02-03 DIAGNOSIS — M79.675 PAIN IN LEFT TOE(S): ICD-10-CM

## 2025-02-03 DIAGNOSIS — E10.49 OTHER DIABETIC NEUROLOGICAL COMPLICATION ASSOCIATED WITH TYPE 1 DIABETES MELLITUS (HCC): Primary | ICD-10-CM

## 2025-02-03 DIAGNOSIS — M79.674 PAIN IN RIGHT TOE(S): ICD-10-CM

## 2025-02-03 DIAGNOSIS — R26.2 DIFFICULTY WALKING: ICD-10-CM

## 2025-02-03 PROCEDURE — 11721 DEBRIDE NAIL 6 OR MORE: CPT | Performed by: PODIATRIST

## 2025-02-03 RX ORDER — INSULIN LISPRO 100 [IU]/ML
INJECTION, SOLUTION INTRAVENOUS; SUBCUTANEOUS
COMMUNITY
Start: 2025-01-13

## 2025-02-03 RX ORDER — CICLOPIROX 80 MG/ML
SOLUTION TOPICAL
Qty: 6 ML | Refills: 2 | Status: SHIPPED | OUTPATIENT
Start: 2025-02-03

## 2025-02-03 NOTE — PROGRESS NOTES
[facility] [place] [facility]  [address] [address]  Dept: [phone]  Dept Fax: [phone]  Loc: [phone]    DIABETIC NAIL PROGRESS NOTE  Date of patient's visit: 2/3/2025  Patient's Name:  Bonny Lundberg YOB: 1950            Patient Care Team:  Milagro Gusman DO as PCP - General  David Boone DPM as Consulting Physician (Podiatry)          Chief Complaint   Patient presents with    Diabetes    Toe Pain     Milagro Gusman DO  LOV 12/20/24       Subjective:   Bonny Lundberg comes to clinic for Diabetes and Toe Pain (Milagro Gusman DO/LOV 12/20/24)    she is a diabetic and states that did a diabetic foot exam.  Painful nails.  Patient did have a gout was seen in [place] ER but this is resolved.  Pt currently has complaint of thickened, elongated nails that they cannot manage by themselves.   Pt's primary care physician is Milagro Gusman DO    .   No results found for: \"LABA1C\"   Complains of numbness in the feet bilat.  No past medical history on file.  No results found for: \"LABA1C\"    Allergies   Allergen Reactions    Codeine     Darvon [Propoxyphene]     Iodine     Plum Pulp      plums    Vicodin [Hydrocodone-Acetaminophen]      Current Outpatient Medications on File Prior to Visit   Medication Sig Dispense Refill    HUMALOG KWIKPEN 100 UNIT/ML SOPN Inject into the skin 3 times daily (before meals)      PARoxetine (PAXIL) 40 MG tablet Take 1 tablet by mouth every morning 90 tablet 1    LANTUS SOLOSTAR 100 UNIT/ML injection pen       magnesium oxide (MAG-OX) 400 (240 Mg) MG tablet TAKE 1 TABLET BY MOUTH TWICE DAILY      atorvastatin (LIPITOR) 40 MG tablet       Blood Glucose Monitoring Suppl (TRUE METRIX METER) w/Device KIT USE AS DIRECTED      TRUE METRIX BLOOD GLUCOSE TEST strip USE AS DIRECTED to test BLOOD SUGAR THREE TIMES DAILY      Drug [place] Unilet Lancets 33G MISC USE AS DIRECTED THREE TIMES DAILY

## 2025-06-09 RX ORDER — PAROXETINE 40 MG/1
40 TABLET, FILM COATED ORAL EVERY MORNING
Qty: 30 TABLET | Refills: 0 | Status: SHIPPED | OUTPATIENT
Start: 2025-06-09

## 2025-06-09 NOTE — TELEPHONE ENCOUNTER
Name of Medication(s) Requested:  Requested Prescriptions     Pending Prescriptions Disp Refills    PARoxetine (PAXIL) 40 MG tablet [Pharmacy Med Name: paroxetine 40 mg tablet] 90 tablet 1     Sig: TAKE 1 TABLET BY MOUTH EVERY MORNING       Medication is on current medication list Yes    Dosage and directions were verified? Yes    Quantity verified: 30 day supply     Pharmacy Verified?  Yes    Last Appointment:  Visit date not found    Future appts:  Future Appointments   Date Time Provider Department Center   6/16/2025  8:30 AM David Boone DPM [facility]        (If no appt send self scheduling link. .REFILLAPPT)  Scheduling request sent?     [] Yes  [] No    Does patient need updated?  [] Yes  [] No